# Patient Record
Sex: MALE | Race: WHITE | HISPANIC OR LATINO | Employment: FULL TIME | URBAN - METROPOLITAN AREA
[De-identification: names, ages, dates, MRNs, and addresses within clinical notes are randomized per-mention and may not be internally consistent; named-entity substitution may affect disease eponyms.]

---

## 2020-05-13 ENCOUNTER — APPOINTMENT (OUTPATIENT)
Dept: CT IMAGING | Facility: HOSPITAL | Age: 20
End: 2020-05-13

## 2020-05-13 ENCOUNTER — HOSPITAL ENCOUNTER (EMERGENCY)
Facility: HOSPITAL | Age: 20
Discharge: HOME | End: 2020-05-14
Attending: EMERGENCY MEDICINE | Admitting: EMERGENCY MEDICINE

## 2020-05-13 ENCOUNTER — APPOINTMENT (OUTPATIENT)
Dept: GENERAL RADIOLOGY | Facility: HOSPITAL | Age: 20
End: 2020-05-13

## 2020-05-13 DIAGNOSIS — I26.99 ACUTE PULMONARY EMBOLISM WITHOUT ACUTE COR PULMONALE, UNSPECIFIED PULMONARY EMBOLISM TYPE (HCC): Primary | ICD-10-CM

## 2020-05-13 LAB
ALBUMIN SERPL-MCNC: 4.7 G/DL (ref 3.5–5.2)
ALBUMIN/GLOB SERPL: 1.4 G/DL
ALP SERPL-CCNC: 77 U/L (ref 39–117)
ALT SERPL W P-5'-P-CCNC: 59 U/L (ref 1–41)
ANION GAP SERPL CALCULATED.3IONS-SCNC: 11.6 MMOL/L (ref 5–15)
AST SERPL-CCNC: 26 U/L (ref 1–40)
BASOPHILS # BLD AUTO: 0.01 10*3/MM3 (ref 0–0.2)
BASOPHILS NFR BLD AUTO: 0.1 % (ref 0–1.5)
BILIRUB SERPL-MCNC: 0.8 MG/DL (ref 0.2–1.2)
BUN BLD-MCNC: 6 MG/DL (ref 6–20)
BUN/CREAT SERPL: 6.1 (ref 7–25)
CALCIUM SPEC-SCNC: 10 MG/DL (ref 8.6–10.5)
CHLORIDE SERPL-SCNC: 98 MMOL/L (ref 98–107)
CO2 SERPL-SCNC: 27.4 MMOL/L (ref 22–29)
CREAT BLD-MCNC: 0.99 MG/DL (ref 0.76–1.27)
D DIMER PPP FEU-MCNC: 3.24 MCGFEU/ML (ref 0–0.49)
DEPRECATED RDW RBC AUTO: 39.9 FL (ref 37–54)
EOSINOPHIL # BLD AUTO: 0.02 10*3/MM3 (ref 0–0.4)
EOSINOPHIL NFR BLD AUTO: 0.2 % (ref 0.3–6.2)
ERYTHROCYTE [DISTWIDTH] IN BLOOD BY AUTOMATED COUNT: 12.3 % (ref 12.3–15.4)
GFR SERPL CREATININE-BSD FRML MDRD: 116 ML/MIN/1.73
GFR SERPL CREATININE-BSD FRML MDRD: 95 ML/MIN/1.73
GLOBULIN UR ELPH-MCNC: 3.4 GM/DL
GLUCOSE BLD-MCNC: 104 MG/DL (ref 65–99)
HCT VFR BLD AUTO: 44.5 % (ref 37.5–51)
HGB BLD-MCNC: 15.1 G/DL (ref 13–17.7)
HOLD SPECIMEN: NORMAL
HOLD SPECIMEN: NORMAL
IMM GRANULOCYTES # BLD AUTO: 0.05 10*3/MM3 (ref 0–0.05)
IMM GRANULOCYTES NFR BLD AUTO: 0.4 % (ref 0–0.5)
LIPASE SERPL-CCNC: 12 U/L (ref 13–60)
LYMPHOCYTES # BLD AUTO: 1.27 10*3/MM3 (ref 0.7–3.1)
LYMPHOCYTES NFR BLD AUTO: 10 % (ref 19.6–45.3)
MCH RBC QN AUTO: 30.4 PG (ref 26.6–33)
MCHC RBC AUTO-ENTMCNC: 33.9 G/DL (ref 31.5–35.7)
MCV RBC AUTO: 89.5 FL (ref 79–97)
MONOCYTES # BLD AUTO: 0.8 10*3/MM3 (ref 0.1–0.9)
MONOCYTES NFR BLD AUTO: 6.3 % (ref 5–12)
NEUTROPHILS # BLD AUTO: 10.5 10*3/MM3 (ref 1.7–7)
NEUTROPHILS NFR BLD AUTO: 83 % (ref 42.7–76)
NRBC BLD AUTO-RTO: 0 /100 WBC (ref 0–0.2)
NT-PROBNP SERPL-MCNC: 31 PG/ML (ref 5–450)
PLATELET # BLD AUTO: 217 10*3/MM3 (ref 140–450)
PMV BLD AUTO: 9.1 FL (ref 6–12)
POTASSIUM BLD-SCNC: 3.7 MMOL/L (ref 3.5–5.2)
PROT SERPL-MCNC: 8.1 G/DL (ref 6–8.5)
RBC # BLD AUTO: 4.97 10*6/MM3 (ref 4.14–5.8)
SODIUM BLD-SCNC: 137 MMOL/L (ref 136–145)
TROPONIN T SERPL-MCNC: <0.01 NG/ML (ref 0–0.03)
WBC NRBC COR # BLD: 12.65 10*3/MM3 (ref 3.4–10.8)
WHOLE BLOOD HOLD SPECIMEN: NORMAL
WHOLE BLOOD HOLD SPECIMEN: NORMAL

## 2020-05-13 PROCEDURE — 96374 THER/PROPH/DIAG INJ IV PUSH: CPT

## 2020-05-13 PROCEDURE — 83880 ASSAY OF NATRIURETIC PEPTIDE: CPT | Performed by: NURSE PRACTITIONER

## 2020-05-13 PROCEDURE — 36415 COLL VENOUS BLD VENIPUNCTURE: CPT

## 2020-05-13 PROCEDURE — 25010000002 HYDROMORPHONE 1 MG/ML SOLUTION: Performed by: EMERGENCY MEDICINE

## 2020-05-13 PROCEDURE — 71045 X-RAY EXAM CHEST 1 VIEW: CPT

## 2020-05-13 PROCEDURE — 74177 CT ABD & PELVIS W/CONTRAST: CPT

## 2020-05-13 PROCEDURE — 0 IOPAMIDOL PER 1 ML: Performed by: EMERGENCY MEDICINE

## 2020-05-13 PROCEDURE — 25010000002 ONDANSETRON PER 1 MG: Performed by: EMERGENCY MEDICINE

## 2020-05-13 PROCEDURE — 85025 COMPLETE CBC W/AUTO DIFF WBC: CPT | Performed by: NURSE PRACTITIONER

## 2020-05-13 PROCEDURE — 71275 CT ANGIOGRAPHY CHEST: CPT

## 2020-05-13 PROCEDURE — 80053 COMPREHEN METABOLIC PANEL: CPT | Performed by: NURSE PRACTITIONER

## 2020-05-13 PROCEDURE — 85379 FIBRIN DEGRADATION QUANT: CPT | Performed by: NURSE PRACTITIONER

## 2020-05-13 PROCEDURE — 96375 TX/PRO/DX INJ NEW DRUG ADDON: CPT

## 2020-05-13 PROCEDURE — 93010 ELECTROCARDIOGRAM REPORT: CPT | Performed by: INTERNAL MEDICINE

## 2020-05-13 PROCEDURE — 25010000002 KETOROLAC TROMETHAMINE PER 15 MG: Performed by: NURSE PRACTITIONER

## 2020-05-13 PROCEDURE — 83690 ASSAY OF LIPASE: CPT | Performed by: NURSE PRACTITIONER

## 2020-05-13 PROCEDURE — 84484 ASSAY OF TROPONIN QUANT: CPT | Performed by: NURSE PRACTITIONER

## 2020-05-13 PROCEDURE — 93005 ELECTROCARDIOGRAM TRACING: CPT | Performed by: NURSE PRACTITIONER

## 2020-05-13 PROCEDURE — 99284 EMERGENCY DEPT VISIT MOD MDM: CPT

## 2020-05-13 RX ORDER — SODIUM CHLORIDE 0.9 % (FLUSH) 0.9 %
10 SYRINGE (ML) INJECTION AS NEEDED
Status: DISCONTINUED | OUTPATIENT
Start: 2020-05-13 | End: 2020-05-14 | Stop reason: HOSPADM

## 2020-05-13 RX ORDER — KETOROLAC TROMETHAMINE 30 MG/ML
30 INJECTION, SOLUTION INTRAMUSCULAR; INTRAVENOUS ONCE
Status: COMPLETED | OUTPATIENT
Start: 2020-05-13 | End: 2020-05-13

## 2020-05-13 RX ORDER — ONDANSETRON 2 MG/ML
4 INJECTION INTRAMUSCULAR; INTRAVENOUS ONCE
Status: COMPLETED | OUTPATIENT
Start: 2020-05-13 | End: 2020-05-13

## 2020-05-13 RX ORDER — HYDROCODONE BITARTRATE AND ACETAMINOPHEN 5; 325 MG/1; MG/1
1 TABLET ORAL EVERY 6 HOURS PRN
Qty: 8 TABLET | Refills: 0 | Status: SHIPPED | OUTPATIENT
Start: 2020-05-13 | End: 2020-05-16 | Stop reason: HOSPADM

## 2020-05-13 RX ADMIN — HYDROMORPHONE HYDROCHLORIDE 1 MG: 1 INJECTION, SOLUTION INTRAMUSCULAR; INTRAVENOUS; SUBCUTANEOUS at 20:05

## 2020-05-13 RX ADMIN — ONDANSETRON 4 MG: 2 INJECTION INTRAMUSCULAR; INTRAVENOUS at 20:05

## 2020-05-13 RX ADMIN — KETOROLAC TROMETHAMINE 30 MG: 30 INJECTION, SOLUTION INTRAMUSCULAR at 19:33

## 2020-05-13 RX ADMIN — APIXABAN 10 MG: 5 TABLET, FILM COATED ORAL at 22:58

## 2020-05-13 RX ADMIN — IOPAMIDOL 95 ML: 755 INJECTION, SOLUTION INTRAVENOUS at 21:59

## 2020-05-13 RX ADMIN — SODIUM CHLORIDE, POTASSIUM CHLORIDE, SODIUM LACTATE AND CALCIUM CHLORIDE 1000 ML: 600; 310; 30; 20 INJECTION, SOLUTION INTRAVENOUS at 20:05

## 2020-05-13 NOTE — ED NOTES
Patient to er from urgent care center with c/o right upper ABD/ rib pain that started yesterday and getting worse. Patient reported no nausea at this time. Patient had mask on in triage along with staff.      Efrain Jara, RN  05/13/20 5274

## 2020-05-13 NOTE — ED PROVIDER NOTES
MD ATTESTATION NOTE    The JOYCE and I have discussed this patient's history, physical exam, and treatment plan.  I have reviewed the documentation and personally had a face to face interaction with the patient. I affirm the documentation and agree with the treatment and plan.  The attached note describes my personal findings.      Jourdan Dumont is a 19 y.o. male who presents to the ED c/o right upper quadrant abdominal pain and right lower chest pain.  Pain is constant.  Onset yesterday.  It is sharp.  It worsens whenever he moves or takes a big breath.  Denies having any recent surgeries.  No recent long distance travel.  No history of DVT.      On exam:  No chest wall or abdominal tenderness  Regular rate and rhythm  Clear to auscultation bilaterally  No lower extremity edema or tenderness    Labs  Recent Results (from the past 24 hour(s))   Comprehensive Metabolic Panel    Collection Time: 05/13/20  7:31 PM   Result Value Ref Range    Glucose 104 (H) 65 - 99 mg/dL    BUN 6 6 - 20 mg/dL    Creatinine 0.99 0.76 - 1.27 mg/dL    Sodium 137 136 - 145 mmol/L    Potassium 3.7 3.5 - 5.2 mmol/L    Chloride 98 98 - 107 mmol/L    CO2 27.4 22.0 - 29.0 mmol/L    Calcium 10.0 8.6 - 10.5 mg/dL    Total Protein 8.1 6.0 - 8.5 g/dL    Albumin 4.70 3.50 - 5.20 g/dL    ALT (SGPT) 59 (H) 1 - 41 U/L    AST (SGOT) 26 1 - 40 U/L    Alkaline Phosphatase 77 39 - 117 U/L    Total Bilirubin 0.8 0.2 - 1.2 mg/dL    eGFR Non African Amer 95 >60 mL/min/1.73    eGFR  African Amer 116 >60 mL/min/1.73    Globulin 3.4 gm/dL    A/G Ratio 1.4 g/dL    BUN/Creatinine Ratio 6.1 (L) 7.0 - 25.0    Anion Gap 11.6 5.0 - 15.0 mmol/L   Lipase    Collection Time: 05/13/20  7:31 PM   Result Value Ref Range    Lipase 12 (L) 13 - 60 U/L   CBC Auto Differential    Collection Time: 05/13/20  7:31 PM   Result Value Ref Range    WBC 12.65 (H) 3.40 - 10.80 10*3/mm3    RBC 4.97 4.14 - 5.80 10*6/mm3    Hemoglobin 15.1 13.0 - 17.7 g/dL    Hematocrit 44.5 37.5 - 51.0 %     MCV 89.5 79.0 - 97.0 fL    MCH 30.4 26.6 - 33.0 pg    MCHC 33.9 31.5 - 35.7 g/dL    RDW 12.3 12.3 - 15.4 %    RDW-SD 39.9 37.0 - 54.0 fl    MPV 9.1 6.0 - 12.0 fL    Platelets 217 140 - 450 10*3/mm3    Neutrophil % 83.0 (H) 42.7 - 76.0 %    Lymphocyte % 10.0 (L) 19.6 - 45.3 %    Monocyte % 6.3 5.0 - 12.0 %    Eosinophil % 0.2 (L) 0.3 - 6.2 %    Basophil % 0.1 0.0 - 1.5 %    Immature Grans % 0.4 0.0 - 0.5 %    Neutrophils, Absolute 10.50 (H) 1.70 - 7.00 10*3/mm3    Lymphocytes, Absolute 1.27 0.70 - 3.10 10*3/mm3    Monocytes, Absolute 0.80 0.10 - 0.90 10*3/mm3    Eosinophils, Absolute 0.02 0.00 - 0.40 10*3/mm3    Basophils, Absolute 0.01 0.00 - 0.20 10*3/mm3    Immature Grans, Absolute 0.05 0.00 - 0.05 10*3/mm3    nRBC 0.0 0.0 - 0.2 /100 WBC   Light Blue Top    Collection Time: 05/13/20  7:31 PM   Result Value Ref Range    Extra Tube hold for add-on    Green Top (Gel)    Collection Time: 05/13/20  7:31 PM   Result Value Ref Range    Extra Tube Hold for add-ons.    Lavender Top    Collection Time: 05/13/20  7:31 PM   Result Value Ref Range    Extra Tube hold for add-on    Gold Top - SST    Collection Time: 05/13/20  7:31 PM   Result Value Ref Range    Extra Tube Hold for add-ons.    D-dimer, Quantitative    Collection Time: 05/13/20  7:31 PM   Result Value Ref Range    D-Dimer, Quantitative 3.24 (H) 0.00 - 0.49 MCGFEU/mL   BNP    Collection Time: 05/13/20  7:31 PM   Result Value Ref Range    proBNP 31.0 5.0 - 450.0 pg/mL   Troponin    Collection Time: 05/13/20  7:31 PM   Result Value Ref Range    Troponin T <0.010 0.000 - 0.030 ng/mL       Radiology  Ct Abdomen Pelvis With Contrast    Result Date: 5/13/2020  CT ABDOMEN PELVIS W CONTRAST-  CLINICAL HISTORY: Pleuritic chest pain  TECHNIQUE: Spiral CT images were acquired through the abdomen and pelvis with IV contrast only and were reconstructed in 3 mm thick slices.  Radiation dose reduction techniques were utilized, including automated exposure control and exposure  modulation based on body size.  COMPARISON: None  FINDINGS: The liver, spleen, pancreas, kidneys, and adrenal glands are unremarkable. The stomach and small and large bowel appear within normal limits. There is no bowel distention. There are no hernias. Images through the lung bases demonstrate bilateral lower lobe pulmonary thromboemboli and also an infiltrative the right lung base posteriorly that is quite likely an area of pulmonary infarction.      Lung findings as noted. No acute process is identified within the abdomen and pelvis.  Comment: Note that there are no findings on the CT of the chest would suggest significant right ventricular strain. The RV LV ratio is 1.0  This report was finalized on 5/13/2020 10:44 PM by Dr. Raheel Pedroza M.D.      Xr Chest 1 View    Result Date: 5/13/2020  XR CHEST 1 VW-  5/13/2020  HISTORY: Chest pain.  Heart size is within normal limits. Lungs appear free of acute infiltrates. Bones and soft tissues are unremarkable.      1. No acute process.  This report was finalized on 5/13/2020 7:12 PM by Dr. Ranjan Christiansen M.D.      Ct Angiogram Chest    Result Date: 5/13/2020  CT ANGIOGRAM CHEST-  CLINICAL HISTORY: Pleuritic chest pain. Evaluate for pulmonary embolism.  TECHNIQUE: Spiral CT images were obtained through the chest during rapid IV injection of contrast and were reconstructed in 2 mm thick axial slices. Multiple coronal and sagittal and 3-D reconstructions were produced.  Radiation dose reduction techniques were utilized, including automated exposure control and exposure modulation based on body size.  COMPARISON: None  FINDINGS: The main pulmonary arteries and their lobar and segmental branches are well opacified. There are moderate-sized filling defects within the right lower lobe pulmonary artery extending into multiple segmental and subsegmental branches within the right lower lobe consistent with pulmonary thromboemboli. A few small pulmonary thromboemboli are also  present within segmental and subsegmental branches of the left lower lobe pulmonary artery. No other filling defects are identified. Lung window images demonstrate localized airspace infiltrate in the posterior and inferior aspect of the right lung base that is quite likely a developing area of pulmonary infarction. No other infiltrates are identified. There are no pleural effusions. There is no mediastinal hilar or axillary lymphadenopathy.      Bilateral lower lobe pulmonary thromboemboli, larger and more numerous on the right than the left with a developing area of pulmonary infarction in the posterior aspect of the right lung base.  This report was finalized on 5/13/2020 10:39 PM by Dr. Raheel Pedroza M.D.        Medical Decision Making:  ED Course as of May 14 0005   Wed May 13, 2020   1950 EKG          EKG time: 1925  Rhythm/Rate: NSR 81  P waves and HI: Normal   QRS, axis: Normal, Normal    ST and T waves: No GARCIA     Interpreted Contemporaneously by me, independently viewed  No prior available     [JS]   2033 D-Dimer, Quant(!): 3.24 [TD]      ED Course User Index  [JS] Onelia Recinos APRN  [TD] Ricco Kimble II, MD       Smoking cessation counseling  I counseled the patient face to face > 3 minutes and < 10 minutes to quit the use of tobacco and provided tobacco cessation strategies.    CT interpreted by myself shows PE that is most prominent on the right side. No RV strain noted.     I discussed the case with Dr. Pedroza, radiology.  Patient has a pulmonary embolism.  Patient has been given his first dose in the emergency department.  He is at low risk for any complications given existing criteria.  He is not tachycardic, not hypoxemic and is in no respiratory distress whatsoever.   seen the patient and he is going to be following up with PCP, the plan is to have him see Nallely Saavedra.      I gave him good return precautions.  I impressed on him the extreme importance of taking these  medications.  He seems to be somewhat skeptical about the need and in fact asked me what would happen if he chose not to take these medications.  Let him know that I truly believe that he will need to be on these blood thinners for the rest of his life.  That he should have further testing performed before committing to lifelong therapy.      Diagnosis  Final diagnoses:   Acute pulmonary embolism without acute cor pulmonale, unspecified pulmonary embolism type (CMS/HCC)       DISCHARGE    FOLLOW-UP  [facility] Emergency Department  [address] [facility] [address] [phone]  Go in 1 day  If symptoms worsen (increased chest pain, increased shortness of breath, bleeding)      Call your primary care doctor tomorrow to set up a follow-up appointment within 1 to 2 weeks.  You will need additional testing to figure out why your blood clots so easily.             Medication List      New Prescriptions    Eliquis DVT/PE Starter Pack tablet  Take two 5 mg tablets by mouth every 12 hours for 7 days. Followed by one   5 mg tablet every 12 hours. (Dispense starter pack if available)     HYDROcodone-acetaminophen 5-325 MG per tablet  Commonly known as:  NORCO  Take 1 tablet by mouth Every 6 (Six) Hours As Needed for Moderate Pain    for up to 2 days.             Ricco Kimble II, MD  05/14/20 0008       Ricco Kimble II, MD  05/14/20 0991

## 2020-05-13 NOTE — ED PROVIDER NOTES
EMERGENCY DEPARTMENT ENCOUNTER    Room Number:  39/39  Date of encounter:  5/13/2020  PCP: Provider, No Known  Historian: patient   Full history not obtainable due to: none     HPI:  Chief Complaint: chest pain     Context: Jourdan Dumont is a 19 y.o. 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 Pain onset was sudden, reported as constant and sharp in the right side of the chest and upper abdomen. Breathing worsens the pain. Nothing improves the pain-tried IBU without relief. Associated cough, blood tinged sputum at times. No vomiting or diarrhea. No fever. No additional cold symptoms. Seen at [facility] just PTA who referred him here for further evaluation.      No hx of blood clot. No recent prolonged travel or immobilization. . Smokes marijuana. Denies any additional drug use.       MEDICAL RECORD REVIEW:Reviewed Dr Ohara, [facility] physician, note from OV dated today's date, just prior to pt arrival to ED. He reported similar hx of hemoptysis and RUQ pain.       PAST MEDICAL HISTORY    Active Ambulatory Problems     Diagnosis Date Noted   • No Active Ambulatory Problems     Resolved Ambulatory Problems     Diagnosis Date Noted   • No Resolved Ambulatory Problems     No Additional Past Medical History         PAST SURGICAL HISTORY  No past surgical history on file.      FAMILY HISTORY  No family history on file.      SOCIAL HISTORY  Social History     Socioeconomic History   • Marital status: Single     Spouse name: Not on file   • Number of children: Not on file   • Years of education: Not on file   • Highest education level: Not on file   Tobacco Use   • Smoking status: Current Every Day Smoker   • Smokeless tobacco: Never Used   Substance and Sexual Activity   • Alcohol use: Yes         ALLERGIES  Patient has no known allergies.        REVIEW OF SYSTEMS  Review of Systems   All systems reviewed and marked as negative except as listed in  HPI       PHYSICAL EXAM    I have reviewed the triage vital signs and nursing notes.    ED Triage Vitals [05/13/20 1829]   Temp Heart Rate Resp BP SpO2   98.3 °F (36.8 °C) 95 -- -- 100 %      Temp src Heart Rate Source Patient Position BP Location FiO2 (%)   Tympanic -- -- -- --       GENERAL: alert well developed, well nourished in moderate distress secondary to pain, clutching right upper abdomen and chest   HENT: NCAT, neck supple, trachea midline  EYES: no scleral icterus, PERRL, normal conjunctiva  CV: regular rhythm, regular rate, no murmur  RESPIRATORY: unlabored effort, CTAB, mild right sided chest wall tenderness.   ABDOMEN: soft, RUQ tenderness guarded but without rebound, non-distended, bowel sounds present  MUSCULOSKELETAL: no gross deformity  NEURO: alert,  sensory and motor function of extremities grossly intact, speech clear, mental status normal/baseline  SKIN: warm, dry, no rash  PSYCH:  Appropriate mood and affect    Vital signs and nursing notes reviewed.          LAB RESULTS  Recent Results (from the past 24 hour(s))   CBC Auto Differential    Collection Time: 05/13/20  7:31 PM   Result Value Ref Range    WBC 12.65 (H) 3.40 - 10.80 10*3/mm3    RBC 4.97 4.14 - 5.80 10*6/mm3    Hemoglobin 15.1 13.0 - 17.7 g/dL    Hematocrit 44.5 37.5 - 51.0 %    MCV 89.5 79.0 - 97.0 fL    MCH 30.4 26.6 - 33.0 pg    MCHC 33.9 31.5 - 35.7 g/dL    RDW 12.3 12.3 - 15.4 %    RDW-SD 39.9 37.0 - 54.0 fl    MPV 9.1 6.0 - 12.0 fL    Platelets 217 140 - 450 10*3/mm3    Neutrophil % 83.0 (H) 42.7 - 76.0 %    Lymphocyte % 10.0 (L) 19.6 - 45.3 %    Monocyte % 6.3 5.0 - 12.0 %    Eosinophil % 0.2 (L) 0.3 - 6.2 %    Basophil % 0.1 0.0 - 1.5 %    Immature Grans % 0.4 0.0 - 0.5 %    Neutrophils, Absolute 10.50 (H) 1.70 - 7.00 10*3/mm3    Lymphocytes, Absolute 1.27 0.70 - 3.10 10*3/mm3    Monocytes, Absolute 0.80 0.10 - 0.90 10*3/mm3    Eosinophils, Absolute 0.02 0.00 - 0.40 10*3/mm3    Basophils, Absolute 0.01 0.00 - 0.20 10*3/mm3     Immature Grans, Absolute 0.05 0.00 - 0.05 10*3/mm3    nRBC 0.0 0.0 - 0.2 /100 WBC       Ordered the above labs and independently reviewed the results.        RADIOLOGY  Xr Chest 1 View    Result Date: 5/13/2020  XR CHEST 1 VW-  5/13/2020  HISTORY: Chest pain.  Heart size is within normal limits. Lungs appear free of acute infiltrates. Bones and soft tissues are unremarkable.      1. No acute process.  This report was finalized on 5/13/2020 7:12 PM by Dr. Ranjan Christiansen M.D.        I ordered the above noted radiological studies. Independently reviewed by me and discussed with radiologist.  See dictation above for official radiology interpretation.      PROCEDURES    Procedures        MEDICATIONS GIVEN IN ER    Medications   sodium chloride 0.9 % flush 10 mL (has no administration in time range)   HYDROmorphone (DILAUDID) injection 1 mg (has no administration in time range)   ondansetron (ZOFRAN) injection 4 mg (has no administration in time range)   lactated ringers bolus 1,000 mL (has no administration in time range)   ketorolac (TORADOL) injection 30 mg (30 mg Intravenous Given 5/13/20 1933)         PROGRESS, DATA ANALYSIS, CONSULTS, AND MEDICAL DECISION MAKING    All labs have been independently reviewed by me.  All radiology studies have been reviewed by me.   EKG's independently reviewed by me.  Discussion below represents my analysis of pertinent findings related to patient's condition, differential diagnosis, treatment plan and final disposition.    DIFFERENTIAL DIAGNOSIS INCLUDE BUT NOT LIMITED TO: Cholecystitis, appendicitis, acute abdomen, acute pancreatitis, pulmonary embolism, costochondritis, pleurisy, pneumothorax, URI, aortic dissection, endocarditis, myocarditis, AMI, polysubstance abuse, cyclic abdominal pain syndrome, nephrolithiasis, renal infarct    ED Course as of May 13 1950   Wed May 13, 2020   1950 EKG          EKG time: 1925  Rhythm/Rate: NSR 81  P waves and OR: Normal   QRS, axis:  Normal, Normal    ST and T waves: No GARCIA     Interpreted Contemporaneously by me, independently viewed  No prior available     [JS]      ED Course User Index  [JS] Onelia Recinos APRN       AS OF 19:50 VITALS:    BP - 122/68  HR - 81  TEMP - 98.3 °F (36.8 °C) (Tympanic)  02 SATS - 99%         Onelia Recinos APRN  05/13/20 1949       Onelia Recinos APRN  05/13/20 1950

## 2020-05-14 ENCOUNTER — APPOINTMENT (OUTPATIENT)
Dept: CARDIOLOGY | Facility: HOSPITAL | Age: 20
End: 2020-05-14

## 2020-05-14 ENCOUNTER — HOSPITAL ENCOUNTER (INPATIENT)
Facility: HOSPITAL | Age: 20
LOS: 2 days | Discharge: HOME | End: 2020-05-16
Attending: EMERGENCY MEDICINE | Admitting: INTERNAL MEDICINE

## 2020-05-14 ENCOUNTER — NURSE TRIAGE (OUTPATIENT)
Dept: CALL CENTER | Facility: HOSPITAL | Age: 20
End: 2020-05-14

## 2020-05-14 VITALS
RESPIRATION RATE: 16 BRPM | OXYGEN SATURATION: 98 % | WEIGHT: 160 LBS | BODY MASS INDEX: 23.7 KG/M2 | SYSTOLIC BLOOD PRESSURE: 124 MMHG | HEART RATE: 88 BPM | TEMPERATURE: 97.8 F | HEIGHT: 69 IN | DIASTOLIC BLOOD PRESSURE: 74 MMHG

## 2020-05-14 DIAGNOSIS — I26.99 BILATERAL PULMONARY EMBOLISM (HCC): Primary | ICD-10-CM

## 2020-05-14 DIAGNOSIS — I26.99 ACUTE PULMONARY EMBOLISM WITHOUT ACUTE COR PULMONALE, UNSPECIFIED PULMONARY EMBOLISM TYPE (HCC): ICD-10-CM

## 2020-05-14 DIAGNOSIS — I26.99 PULMONARY INFARCT (HCC): ICD-10-CM

## 2020-05-14 LAB
ALBUMIN SERPL-MCNC: 4.6 G/DL (ref 3.5–5.2)
ALBUMIN/GLOB SERPL: 1.4 G/DL
ALP SERPL-CCNC: 74 U/L (ref 39–117)
ALT SERPL W P-5'-P-CCNC: 48 U/L (ref 1–41)
ANION GAP SERPL CALCULATED.3IONS-SCNC: 12.1 MMOL/L (ref 5–15)
APTT PPP: 38.3 SECONDS (ref 22.7–35.4)
APTT PPP: 49.1 SECONDS (ref 22.7–35.4)
AST SERPL-CCNC: 20 U/L (ref 1–40)
BASOPHILS # BLD AUTO: 0.01 10*3/MM3 (ref 0–0.2)
BASOPHILS NFR BLD AUTO: 0.1 % (ref 0–1.5)
BH CV LOW VAS LEFT POSTERIOR TIBIAL VESSEL: 1
BH CV LOWER VASCULAR LEFT COMMON FEMORAL AUGMENT: NORMAL
BH CV LOWER VASCULAR LEFT COMMON FEMORAL COMPETENT: NORMAL
BH CV LOWER VASCULAR LEFT COMMON FEMORAL COMPRESS: NORMAL
BH CV LOWER VASCULAR LEFT COMMON FEMORAL PHASIC: NORMAL
BH CV LOWER VASCULAR LEFT COMMON FEMORAL SPONT: NORMAL
BH CV LOWER VASCULAR LEFT DISTAL FEMORAL COMPRESS: NORMAL
BH CV LOWER VASCULAR LEFT GASTRONEMIUS COMPRESS: NORMAL
BH CV LOWER VASCULAR LEFT GREATER SAPH AK COMPRESS: NORMAL
BH CV LOWER VASCULAR LEFT GREATER SAPH BK COMPRESS: NORMAL
BH CV LOWER VASCULAR LEFT MID FEMORAL AUGMENT: NORMAL
BH CV LOWER VASCULAR LEFT MID FEMORAL COMPETENT: NORMAL
BH CV LOWER VASCULAR LEFT MID FEMORAL COMPRESS: NORMAL
BH CV LOWER VASCULAR LEFT MID FEMORAL PHASIC: NORMAL
BH CV LOWER VASCULAR LEFT MID FEMORAL SPONT: NORMAL
BH CV LOWER VASCULAR LEFT PERONEAL COMPRESS: NORMAL
BH CV LOWER VASCULAR LEFT POPLITEAL AUGMENT: NORMAL
BH CV LOWER VASCULAR LEFT POPLITEAL COMPETENT: NORMAL
BH CV LOWER VASCULAR LEFT POPLITEAL COMPRESS: NORMAL
BH CV LOWER VASCULAR LEFT POPLITEAL PHASIC: NORMAL
BH CV LOWER VASCULAR LEFT POPLITEAL SPONT: NORMAL
BH CV LOWER VASCULAR LEFT POSTERIOR TIBIAL COMPRESS: NORMAL
BH CV LOWER VASCULAR LEFT POSTERIOR TIBIAL THROMBUS: NORMAL
BH CV LOWER VASCULAR LEFT PROFUNDA FEMORAL COMPRESS: NORMAL
BH CV LOWER VASCULAR LEFT PROXIMAL FEMORAL COMPRESS: NORMAL
BH CV LOWER VASCULAR LEFT SAPHENOFEMORAL JUNCTION COMPRESS: NORMAL
BH CV LOWER VASCULAR RIGHT COMMON FEMORAL AUGMENT: NORMAL
BH CV LOWER VASCULAR RIGHT COMMON FEMORAL COMPETENT: NORMAL
BH CV LOWER VASCULAR RIGHT COMMON FEMORAL COMPRESS: NORMAL
BH CV LOWER VASCULAR RIGHT COMMON FEMORAL PHASIC: NORMAL
BH CV LOWER VASCULAR RIGHT COMMON FEMORAL SPONT: NORMAL
BH CV LOWER VASCULAR RIGHT DISTAL FEMORAL COMPRESS: NORMAL
BH CV LOWER VASCULAR RIGHT GASTRONEMIUS COMPRESS: NORMAL
BH CV LOWER VASCULAR RIGHT GREATER SAPH AK COMPRESS: NORMAL
BH CV LOWER VASCULAR RIGHT GREATER SAPH BK COMPRESS: NORMAL
BH CV LOWER VASCULAR RIGHT MID FEMORAL AUGMENT: NORMAL
BH CV LOWER VASCULAR RIGHT MID FEMORAL COMPETENT: NORMAL
BH CV LOWER VASCULAR RIGHT MID FEMORAL COMPRESS: NORMAL
BH CV LOWER VASCULAR RIGHT MID FEMORAL PHASIC: NORMAL
BH CV LOWER VASCULAR RIGHT MID FEMORAL SPONT: NORMAL
BH CV LOWER VASCULAR RIGHT PERONEAL COMPRESS: NORMAL
BH CV LOWER VASCULAR RIGHT POPLITEAL AUGMENT: NORMAL
BH CV LOWER VASCULAR RIGHT POPLITEAL COMPETENT: NORMAL
BH CV LOWER VASCULAR RIGHT POPLITEAL COMPRESS: NORMAL
BH CV LOWER VASCULAR RIGHT POPLITEAL PHASIC: NORMAL
BH CV LOWER VASCULAR RIGHT POPLITEAL SPONT: NORMAL
BH CV LOWER VASCULAR RIGHT POSTERIOR TIBIAL COMPRESS: NORMAL
BH CV LOWER VASCULAR RIGHT PROFUNDA FEMORAL COMPRESS: NORMAL
BH CV LOWER VASCULAR RIGHT PROXIMAL FEMORAL COMPRESS: NORMAL
BH CV LOWER VASCULAR RIGHT SAPHENOFEMORAL JUNCTION COMPRESS: NORMAL
BILIRUB SERPL-MCNC: 1.1 MG/DL (ref 0.2–1.2)
BUN BLD-MCNC: 6 MG/DL (ref 6–20)
BUN/CREAT SERPL: 6 (ref 7–25)
CALCIUM SPEC-SCNC: 9.6 MG/DL (ref 8.6–10.5)
CHLORIDE SERPL-SCNC: 98 MMOL/L (ref 98–107)
CO2 SERPL-SCNC: 26.9 MMOL/L (ref 22–29)
CREAT BLD-MCNC: 1 MG/DL (ref 0.76–1.27)
DEPRECATED RDW RBC AUTO: 41.6 FL (ref 37–54)
EOSINOPHIL # BLD AUTO: 0.01 10*3/MM3 (ref 0–0.4)
EOSINOPHIL NFR BLD AUTO: 0.1 % (ref 0.3–6.2)
ERYTHROCYTE [DISTWIDTH] IN BLOOD BY AUTOMATED COUNT: 12.7 % (ref 12.3–15.4)
GFR SERPL CREATININE-BSD FRML MDRD: 114 ML/MIN/1.73
GFR SERPL CREATININE-BSD FRML MDRD: 94 ML/MIN/1.73
GLOBULIN UR ELPH-MCNC: 3.2 GM/DL
GLUCOSE BLD-MCNC: 105 MG/DL (ref 65–99)
HCT VFR BLD AUTO: 44.2 % (ref 37.5–51)
HGB BLD-MCNC: 15.3 G/DL (ref 13–17.7)
IMM GRANULOCYTES # BLD AUTO: 0.04 10*3/MM3 (ref 0–0.05)
IMM GRANULOCYTES NFR BLD AUTO: 0.3 % (ref 0–0.5)
INR PPP: 1.5 (ref 0.9–1.1)
LYMPHOCYTES # BLD AUTO: 1.21 10*3/MM3 (ref 0.7–3.1)
LYMPHOCYTES NFR BLD AUTO: 8.7 % (ref 19.6–45.3)
MCH RBC QN AUTO: 31 PG (ref 26.6–33)
MCHC RBC AUTO-ENTMCNC: 34.6 G/DL (ref 31.5–35.7)
MCV RBC AUTO: 89.7 FL (ref 79–97)
MONOCYTES # BLD AUTO: 0.85 10*3/MM3 (ref 0.1–0.9)
MONOCYTES NFR BLD AUTO: 6.1 % (ref 5–12)
NEUTROPHILS # BLD AUTO: 11.75 10*3/MM3 (ref 1.7–7)
NEUTROPHILS NFR BLD AUTO: 84.7 % (ref 42.7–76)
NRBC BLD AUTO-RTO: 0 /100 WBC (ref 0–0.2)
NT-PROBNP SERPL-MCNC: 76 PG/ML (ref 5–450)
PLATELET # BLD AUTO: 212 10*3/MM3 (ref 140–450)
PMV BLD AUTO: 9.1 FL (ref 6–12)
POTASSIUM BLD-SCNC: 3.8 MMOL/L (ref 3.5–5.2)
PROT SERPL-MCNC: 7.8 G/DL (ref 6–8.5)
PROTHROMBIN TIME: 17.8 SECONDS (ref 11.7–14.2)
RBC # BLD AUTO: 4.93 10*6/MM3 (ref 4.14–5.8)
SODIUM BLD-SCNC: 137 MMOL/L (ref 136–145)
TROPONIN T SERPL-MCNC: <0.01 NG/ML (ref 0–0.03)
WBC NRBC COR # BLD: 13.87 10*3/MM3 (ref 3.4–10.8)

## 2020-05-14 PROCEDURE — 25010000002 METHYLPREDNISOLONE PER 125 MG: Performed by: INTERNAL MEDICINE

## 2020-05-14 PROCEDURE — 85025 COMPLETE CBC W/AUTO DIFF WBC: CPT | Performed by: EMERGENCY MEDICINE

## 2020-05-14 PROCEDURE — 25010000002 MORPHINE PER 10 MG: Performed by: INTERNAL MEDICINE

## 2020-05-14 PROCEDURE — 25010000002 MORPHINE PER 10 MG: Performed by: EMERGENCY MEDICINE

## 2020-05-14 PROCEDURE — 85613 RUSSELL VIPER VENOM DILUTED: CPT | Performed by: INTERNAL MEDICINE

## 2020-05-14 PROCEDURE — 85610 PROTHROMBIN TIME: CPT | Performed by: PHYSICIAN ASSISTANT

## 2020-05-14 PROCEDURE — 81241 F5 GENE: CPT | Performed by: INTERNAL MEDICINE

## 2020-05-14 PROCEDURE — 87635 SARS-COV-2 COVID-19 AMP PRB: CPT | Performed by: INTERNAL MEDICINE

## 2020-05-14 PROCEDURE — 99223 1ST HOSP IP/OBS HIGH 75: CPT | Performed by: INTERNAL MEDICINE

## 2020-05-14 PROCEDURE — 81240 F2 GENE: CPT | Performed by: INTERNAL MEDICINE

## 2020-05-14 PROCEDURE — 99284 EMERGENCY DEPT VISIT MOD MDM: CPT

## 2020-05-14 PROCEDURE — 93010 ELECTROCARDIOGRAM REPORT: CPT | Performed by: INTERNAL MEDICINE

## 2020-05-14 PROCEDURE — 93005 ELECTROCARDIOGRAM TRACING: CPT | Performed by: EMERGENCY MEDICINE

## 2020-05-14 PROCEDURE — 25010000002 ONDANSETRON PER 1 MG: Performed by: EMERGENCY MEDICINE

## 2020-05-14 PROCEDURE — 85730 THROMBOPLASTIN TIME PARTIAL: CPT | Performed by: PHYSICIAN ASSISTANT

## 2020-05-14 PROCEDURE — 85730 THROMBOPLASTIN TIME PARTIAL: CPT | Performed by: INTERNAL MEDICINE

## 2020-05-14 PROCEDURE — 80053 COMPREHEN METABOLIC PANEL: CPT | Performed by: EMERGENCY MEDICINE

## 2020-05-14 PROCEDURE — 85300 ANTITHROMBIN III ACTIVITY: CPT | Performed by: INTERNAL MEDICINE

## 2020-05-14 PROCEDURE — 85306 CLOT INHIBIT PROT S FREE: CPT | Performed by: INTERNAL MEDICINE

## 2020-05-14 PROCEDURE — 25010000002 HEPARIN (PORCINE) PER 1000 UNITS: Performed by: PHYSICIAN ASSISTANT

## 2020-05-14 PROCEDURE — 86146 BETA-2 GLYCOPROTEIN ANTIBODY: CPT | Performed by: INTERNAL MEDICINE

## 2020-05-14 PROCEDURE — 85705 THROMBOPLASTIN INHIBITION: CPT | Performed by: INTERNAL MEDICINE

## 2020-05-14 PROCEDURE — 85302 CLOT INHIBIT PROT C ANTIGEN: CPT | Performed by: INTERNAL MEDICINE

## 2020-05-14 PROCEDURE — 93970 EXTREMITY STUDY: CPT

## 2020-05-14 PROCEDURE — 85240 CLOT FACTOR VIII AHG 1 STAGE: CPT | Performed by: INTERNAL MEDICINE

## 2020-05-14 PROCEDURE — 85732 THROMBOPLASTIN TIME PARTIAL: CPT | Performed by: INTERNAL MEDICINE

## 2020-05-14 PROCEDURE — 85670 THROMBIN TIME PLASMA: CPT | Performed by: INTERNAL MEDICINE

## 2020-05-14 PROCEDURE — 84484 ASSAY OF TROPONIN QUANT: CPT | Performed by: EMERGENCY MEDICINE

## 2020-05-14 PROCEDURE — 83880 ASSAY OF NATRIURETIC PEPTIDE: CPT | Performed by: EMERGENCY MEDICINE

## 2020-05-14 PROCEDURE — 85305 CLOT INHIBIT PROT S TOTAL: CPT | Performed by: INTERNAL MEDICINE

## 2020-05-14 PROCEDURE — 85303 CLOT INHIBIT PROT C ACTIVITY: CPT | Performed by: INTERNAL MEDICINE

## 2020-05-14 PROCEDURE — 86147 CARDIOLIPIN ANTIBODY EA IG: CPT | Performed by: INTERNAL MEDICINE

## 2020-05-14 RX ORDER — ONDANSETRON 2 MG/ML
4 INJECTION INTRAMUSCULAR; INTRAVENOUS ONCE
Status: COMPLETED | OUTPATIENT
Start: 2020-05-14 | End: 2020-05-14

## 2020-05-14 RX ORDER — MORPHINE SULFATE 2 MG/ML
4 INJECTION, SOLUTION INTRAMUSCULAR; INTRAVENOUS EVERY 4 HOURS PRN
Status: DISCONTINUED | OUTPATIENT
Start: 2020-05-14 | End: 2020-05-14

## 2020-05-14 RX ORDER — MORPHINE SULFATE 2 MG/ML
4 INJECTION, SOLUTION INTRAMUSCULAR; INTRAVENOUS ONCE
Status: COMPLETED | OUTPATIENT
Start: 2020-05-14 | End: 2020-05-14

## 2020-05-14 RX ORDER — METHYLPREDNISOLONE SODIUM SUCCINATE 125 MG/2ML
60 INJECTION, POWDER, LYOPHILIZED, FOR SOLUTION INTRAMUSCULAR; INTRAVENOUS 2 TIMES DAILY
Status: DISCONTINUED | OUTPATIENT
Start: 2020-05-14 | End: 2020-05-15

## 2020-05-14 RX ORDER — OXYCODONE HYDROCHLORIDE 5 MG/1
10 TABLET ORAL EVERY 4 HOURS PRN
Status: DISCONTINUED | OUTPATIENT
Start: 2020-05-14 | End: 2020-05-16 | Stop reason: HOSPADM

## 2020-05-14 RX ORDER — SODIUM CHLORIDE 0.9 % (FLUSH) 0.9 %
10 SYRINGE (ML) INJECTION AS NEEDED
Status: DISCONTINUED | OUTPATIENT
Start: 2020-05-14 | End: 2020-05-16 | Stop reason: HOSPADM

## 2020-05-14 RX ORDER — HYDROCODONE BITARTRATE AND ACETAMINOPHEN 7.5; 325 MG/1; MG/1
1 TABLET ORAL EVERY 6 HOURS PRN
Status: DISCONTINUED | OUTPATIENT
Start: 2020-05-14 | End: 2020-05-14

## 2020-05-14 RX ORDER — HEPARIN SODIUM 10000 [USP'U]/100ML
18 INJECTION, SOLUTION INTRAVENOUS
Status: DISCONTINUED | OUTPATIENT
Start: 2020-05-14 | End: 2020-05-16 | Stop reason: HOSPADM

## 2020-05-14 RX ORDER — HYDROCODONE BITARTRATE AND ACETAMINOPHEN 5; 325 MG/1; MG/1
1 TABLET ORAL ONCE
Status: COMPLETED | OUTPATIENT
Start: 2020-05-14 | End: 2020-05-14

## 2020-05-14 RX ORDER — MORPHINE SULFATE 2 MG/ML
4 INJECTION, SOLUTION INTRAMUSCULAR; INTRAVENOUS
Status: DISCONTINUED | OUTPATIENT
Start: 2020-05-14 | End: 2020-05-16 | Stop reason: HOSPADM

## 2020-05-14 RX ADMIN — MORPHINE SULFATE 4 MG: 2 INJECTION, SOLUTION INTRAMUSCULAR; INTRAVENOUS at 13:55

## 2020-05-14 RX ADMIN — MORPHINE SULFATE 4 MG: 2 INJECTION, SOLUTION INTRAMUSCULAR; INTRAVENOUS at 22:16

## 2020-05-14 RX ADMIN — METHYLPREDNISOLONE SODIUM SUCCINATE 60 MG: 125 INJECTION, POWDER, FOR SOLUTION INTRAMUSCULAR; INTRAVENOUS at 16:04

## 2020-05-14 RX ADMIN — HYDROCODONE BITARTRATE AND ACETAMINOPHEN 1 TABLET: 5; 325 TABLET ORAL at 00:10

## 2020-05-14 RX ADMIN — OXYCODONE HYDROCHLORIDE 10 MG: 5 TABLET ORAL at 20:26

## 2020-05-14 RX ADMIN — MORPHINE SULFATE 4 MG: 2 INJECTION, SOLUTION INTRAMUSCULAR; INTRAVENOUS at 06:34

## 2020-05-14 RX ADMIN — HYDROCODONE BITARTRATE AND ACETAMINOPHEN 1 TABLET: 7.5; 325 TABLET ORAL at 09:28

## 2020-05-14 RX ADMIN — ONDANSETRON 4 MG: 2 INJECTION INTRAMUSCULAR; INTRAVENOUS at 06:34

## 2020-05-14 RX ADMIN — OXYCODONE HYDROCHLORIDE 10 MG: 5 TABLET ORAL at 16:04

## 2020-05-14 RX ADMIN — HEPARIN SODIUM 18 UNITS/KG/HR: 10000 INJECTION, SOLUTION INTRAVENOUS at 16:04

## 2020-05-14 RX ADMIN — MORPHINE SULFATE 4 MG: 2 INJECTION, SOLUTION INTRAMUSCULAR; INTRAVENOUS at 18:33

## 2020-05-14 NOTE — TELEPHONE ENCOUNTER
"Reviewed guideline and AVS with caller, AVS states to return to ED if he experiences severe chest pain. Caller advised he should go back to ED for pain control. Caller agrees to follow care advice.     Reason for Disposition  • SEVERE chest pain    Additional Information  • Negative: Severe difficulty breathing (e.g., struggling for each breath, speaks in single words)  • Negative: Difficult to awaken or acting confused (e.g., disoriented, slurred speech)  • Negative: Shock suspected (e.g., cold/pale/clammy skin, too weak to stand, low BP, rapid pulse)  • Negative: [1] Chest pain lasts > 5 minutes AND [2] history of heart disease  (i.e., heart attack, bypass surgery, angina, angioplasty, CHF; not just a heart murmur)  • Negative: [1] Chest pain lasts > 5 minutes AND [2] described as crushing, pressure-like, or heavy  • Negative: [1] Chest pain lasts > 5 minutes AND [2] age > 50  • Negative: [1] Chest pain lasts > 5 minutes AND [2] age > 30 AND [3] at least one cardiac risk factor (i.e., hypertension, diabetes, obesity, smoker or strong family history of heart disease)  • Negative: [1] Chest pain lasts > 5 minutes AND [2] not relieved with nitroglycerin  • Negative: Passed out (i.e., lost consciousness, collapsed and was not responding)  • Negative: Heart beating < 50 beats per minute OR > 140 beats per minute  • Negative: Visible sweat on face or sweat dripping down face  • Negative: Sounds like a life-threatening emergency to the triager  • Negative: Followed a chest injury    Answer Assessment - Initial Assessment Questions  1. LOCATION: \"Where does it hurt?\"        Right chestg and ribs  2. RADIATION: \"Does the pain go anywhere else?\" (e.g., into neck, jaw, arms, back)      no  3. ONSET: \"When did the chest pain begin?\" (Minutes, hours or days)       yesterday  4. PATTERN \"Does the pain come and go, or has it been constant since it started?\"  \"Does it get worse with exertion?\"       Constant   5. DURATION: \"How " "long does it last\" (e.g., seconds, minutes, hours)      Constant   6. SEVERITY: \"How bad is the pain?\"  (e.g., Scale 1-10; mild, moderate, or severe)     - MILD (1-3): doesn't interfere with normal activities      - MODERATE (4-7): interferes with normal activities or awakens from sleep     - SEVERE (8-10): excruciating pain, unable to do any normal activities        10/10  7. CARDIAC RISK FACTORS: \"Do you have any history of heart problems or risk factors for heart disease?\" (e.g., prior heart attack, angina; high blood pressure, diabetes, being overweight, high cholesterol, smoking, or strong family history of heart disease)      Diagnosed with PE earlier tonight in ED  8. PULMONARY RISK FACTORS: \"Do you have any history of lung disease?\"  (e.g., blood clots in lung, asthma, emphysema, birth control pills)      Blood clot in lung  9. CAUSE: \"What do you think is causing the chest pain?\"      PE  10. OTHER SYMPTOMS: \"Do you have any other symptoms?\" (e.g., dizziness, nausea, vomiting, sweating, fever, difficulty breathing, cough)        no  11. PREGNANCY: \"Is there any chance you are pregnant?\" \"When was your last menstrual period?\"        no    Protocols used: CHEST PAIN-ADULT-AH      "

## 2020-05-14 NOTE — ED NOTES
Pt making repeated requests for pain medication. This RN advised medication cannot be administered unless ordered by provider. Provider has not yet seen pt.      Onelia Lee RN  05/14/20 0602

## 2020-05-14 NOTE — PROGRESS NOTES
Discharge Planning Assessment  [facility]     Patient Name: Jourdan Dumont  MRN: [id number]  Today's Date: 5/14/2020    Admit Date: 5/14/2020    Discharge Needs Assessment     Row Name 05/14/20 1646       Living Environment    Lives With  sibling(s);parent(s)    Name(s) of Who Lives With Patient  mother (Domi Mccormick, [phone]), father, and sister    Current Living Arrangements  home/apartment/condo    Primary Care Provided by  self    Provides Primary Care For  no one    Family Caregiver if Needed  parent(s)    Quality of Family Relationships  helpful;involved;supportive    Able to Return to Prior Arrangements  yes       Resource/Environmental Concerns    Resource/Environmental Concerns  none       Transition Planning    Patient/Family Anticipates Transition to  home with family    Patient/Family Anticipated Services at Transition  none    Transportation Anticipated  family or friend will provide       Discharge Needs Assessment    Concerns to be Addressed  no discharge needs identified;denies needs/concerns at this time    Equipment Currently Used at Home  none    Discharge Coordination/Progress  Home        Discharge Plan     Row Name 05/14/20 5281       Plan    Plan  Home with family    Patient/Family in Agreement with Plan  yes    Plan Comments  CCP met with pt to verify information and discuss d/c planning. Pt resides with his parents and sister in a home with no accessibility concerns, and denies h/o DME use, home health, PT. Pt uses [facility] but agrees to Meds to Beds enrollment (updated in EPIC). Pt provided two weeks of Eliquis via [facility] ED visit yesterday. CCP verified via pt's [facility] [address] pharmacy ([phone]) and [facility] pharmacy ([place]) that e-scribed month supply of Eliquis was not received. Will follow up for new precscription and verify via [facility] pharmacy prior to d/c. Pt denies additional known needs at this time. Mary Pathak, ROMEO        Destination       Coordination has not been started for this encounter.      Durable Medical Equipment      Coordination has not been started for this encounter.      Dialysis/Infusion      Coordination has not been started for this encounter.      Home Medical Care      Coordination has not been started for this encounter.      Therapy      Coordination has not been started for this encounter.      Community Resources      Coordination has not been started for this encounter.          Demographic Summary     Row Name 05/14/20 1648       General Information    Admission Type  inpatient    Arrived From  home    Referral Source  admission list    Reason for Consult  discharge planning    Preferred Language  English        Functional Status     Row Name 05/14/20 7227       Functional Status    Usual Activity Tolerance  good    Current Activity Tolerance  good       Functional Status, IADL    Medications  independent    Meal Preparation  independent    Housekeeping  independent    Laundry  independent    Shopping  independent       Mental Status Summary    Recent Changes in Mental Status/Cognitive Functioning  no changes        Psychosocial    No documentation.       Abuse/Neglect    No documentation.       Legal    No documentation.       Substance Abuse    No documentation.       Patient Forms    No documentation.           Mariola Pathak LCSW

## 2020-05-14 NOTE — PROGRESS NOTES
Eliquis starter pack given to pt with instruction pamphlet. Pt verbalized understanding of instructions. Darlene Esparza RN

## 2020-05-14 NOTE — ED TRIAGE NOTES
Pt walked into ED and approached triage desk with c/o chest pain like he had when he left here several hours ago.  Pt states that he has blood clots on his lungs and it hasn't gotten better.  Pt wearing mask.

## 2020-05-14 NOTE — ED PROVIDER NOTES
MD ATTESTATION NOTE    The JOYCE and I have discussed this patient's history, physical exam, and treatment plan. I have reviewed the documentation and personally had a face to face interaction with the patient. I affirm the JOYCE documentation and agree with their diagnostics, findings, treatment, plan, and disposition.  The attached note describes my personal findings.    Jourdan Dumont is a 19 y.o. male who presents to the ED c/o constant right-sided chest pain since Wednesday.  Patient was seen in the emergency department last night, diagnosed with pulmonary embolisms bilaterally as well as right pulmonary infarct.  Patient was discharged.  Patient presented with worsening chest pain, worsening shortness of breath.  Patient does endorse hemoptysis, denies any recent trauma, unilateral leg swelling, major surgery or period of immobility.  Patient is not on hormone therapy, not being treated for cancer.  No history of DVT or PE in the past.  No family history of clotting disorders.    On exam:  General: NAD  Head: NCAT  ENT: Extraocular motion intact, pupils equal and round reactive to light, moist mucous membranes  Neck: Supple, trachea midline  Cardiac, regular rate and rhythm  Lungs: Clear to auscultation bilaterally  Abdomen: Soft, nontender, no rebound tenderness/guarding/rigidity  Extremities: Moves all extremities well, no peripheral edema.Bilateral lower extremities: No edema, no redness warmth or skin changes, no palpable cords, negative Homans  Skin: Warm, dry    Medical Decision Making:  Face mask and gloves were worn throughout the patient encounter, unless additional PPE was worn and specified below. Hand hygiene was performed before entering and after leaving the patient room.      ED Course as of May 14 0744   Thu May 14, 2020   0612 Patient presents to ER with right-sided chest pain since yesterday.  Patient was seen in ER and diagnosed with bilateral PEs.  He says the pain is worse and brought him to the  ER.  Patient slightly tachycardic, sats 99% on room air.  Plan for pain control and reevaluation.    [EE]   0644 EKG interpreted by myself.  Time 0622.  Sinus rhythm, 89 bpm.  Normal P/MARYANA.  QRS shows normal axis.  No significant ST abnormalities.  No significant changes since 5/13/2020.    [EE]   0701 proBNP: 76.0 [EE]   0701 Troponin T: <0.010 [EE]   0701 WBC(!): 13.87 [EE]   0715 Plan to admit patient for further evaluation.    [EE]   0731 I discussed pt with Dr. Jones.  She will admit for further care.  She would like me to start heparin.     [EE]   0737 I discussed heparin treatment with pharmacist.  Ideally patient should wait until his next scheduled dose of Eliquis prior to starting heparin.  This will be at approximately 4 PM.  Patient has stable vitals, I believe this is reasonable.  I will put orders in accordingly.    [EE]      ED Course User Index  [EE] Philipp Cheatham PA       Diagnosis  Final diagnoses:   Bilateral pulmonary embolism (CMS/HCC)   Pulmonary infarct (CMS/HCC)        Sav Albrecht MD  05/14/20 3206

## 2020-05-14 NOTE — CONSULTS
[facility] INITIAL INPATIENT CONSULTATION NOTE    REASON FOR CONSULTATION: Bilateral pulmonary embolism    HISTORY OF PRESENT ILLNESS:  Jourdan Dumont is a 19 y.o. male who we are asked to see today in consultation for bilateral pulmonary embolism.        Presented to urgent care 5/13/2020 with sharp right upper quadrant and right lower chest pain for about 1 day.  Some hemoptysis.  He was sent to the emergency department.    Chest x-ray showed no acute process.  A CT angiogram of the chest 5/13/2020 showed bilateral lower lobe emboli larger and more numerous on the right than the left with developing infarction at the right lung base.    No acute process in the abdomen or pelvis.    Baseline PTT 38.3 seconds.  Baseline INR 1.50.  D-dimer elevated at 3.24.  Complete metabolic panel normal.  Troponin and BNP normal.  Mild leukocytosis with a white blood cell count of 12-13,000 with neutrophilia.  Hemoglobin and platelets normal.  EKG with sinus rhythm.    Denies history of venous thromboembolism.  No known family history.  No leg pain or edema.  No immobilization.  No lower extremity injuries.  No prolonged travel.      Currently experiencing severe sharp right sided chest pain with talking or taking deep breaths.      PMHx:  History reviewed. No pertinent past medical history.    History reviewed. No pertinent surgical history.    SOCIAL HISTORY:   reports that he has been smoking. He has never used smokeless tobacco. He reports that he drinks alcohol. His drug history is not on file.   Some marijuana use    FAMILY HISTORY:  family history is not on file.  No h/o VTE    ALLERGIES:  No Known Allergies    MEDICATIONS:  As listed in the electronic medical record.    Review of Systems   Respiratory: Positive for shortness of breath.    Cardiovascular: Positive for chest pain. Negative for leg swelling.   All other systems reviewed and are negative.      Vitals:    05/14/20 0645 05/14/20 0705 05/14/20 0757  "05/14/20 0848   BP:  109/67  123/88   BP Location:    Left arm   Patient Position:    Lying   Pulse:  80  80   Resp:   22 20   Temp:    98.9 °F (37.2 °C)   TempSrc:    Oral   SpO2:  100%  100%   Weight: 72.6 kg (160 lb 0.9 oz)   65.8 kg (145 lb)   Height:    175.3 cm (69\")       Physical Exam   Constitutional: He is oriented to person, place, and time. He appears well-developed and well-nourished. He appears distressed.   In obvious distress due to pain   HENT:   Head: Normocephalic and atraumatic. Hair is normal.   Mouth/Throat: Oropharynx is clear and moist.   Eyes: Conjunctivae, EOM and lids are normal. Pupils are equal.   Neck: Neck supple. No JVD present. No thyroid mass and no thyromegaly present.   Cardiovascular: Normal rate and regular rhythm. Exam reveals no gallop and no friction rub.   No murmur heard.  Pulmonary/Chest: Effort normal and breath sounds normal. No respiratory distress. He has no wheezes. He has no rales.   Abdominal: Soft. He exhibits no distension and no mass. There is no splenomegaly or hepatomegaly. There is no tenderness. There is no guarding.   Musculoskeletal: Normal range of motion. He exhibits no edema, tenderness or deformity.   Lymphadenopathy:     He has no cervical adenopathy.     He has no axillary adenopathy.        Right: No inguinal and no supraclavicular adenopathy present.        Left: No inguinal and no supraclavicular adenopathy present.   Neurological: He is alert and oriented to person, place, and time. He has normal strength.   Skin: Skin is warm and dry. No rash noted.   Psychiatric: He has a normal mood and affect. His behavior is normal. Judgment and thought content normal. Cognition and memory are normal.   Nursing note and vitals reviewed.      DIAGNOSTIC DATA:  WBC   Date Value Ref Range Status   05/14/2020 13.87 (H) 3.40 - 10.80 10*3/mm3 Final     RBC   Date Value Ref Range Status   05/14/2020 4.93 4.14 - 5.80 10*6/mm3 Final     Hemoglobin   Date Value Ref " Range Status   05/14/2020 15.3 13.0 - 17.7 g/dL Final     Hematocrit   Date Value Ref Range Status   05/14/2020 44.2 37.5 - 51.0 % Final     MCV   Date Value Ref Range Status   05/14/2020 89.7 79.0 - 97.0 fL Final     MCH   Date Value Ref Range Status   05/14/2020 31.0 26.6 - 33.0 pg Final     MCHC   Date Value Ref Range Status   05/14/2020 34.6 31.5 - 35.7 g/dL Final     RDW   Date Value Ref Range Status   05/14/2020 12.7 12.3 - 15.4 % Final     RDW-SD   Date Value Ref Range Status   05/14/2020 41.6 37.0 - 54.0 fl Final     MPV   Date Value Ref Range Status   05/14/2020 9.1 6.0 - 12.0 fL Final     Platelets   Date Value Ref Range Status   05/14/2020 212 140 - 450 10*3/mm3 Final     Neutrophil %   Date Value Ref Range Status   05/14/2020 84.7 (H) 42.7 - 76.0 % Final     Lymphocyte %   Date Value Ref Range Status   05/14/2020 8.7 (L) 19.6 - 45.3 % Final     Monocyte %   Date Value Ref Range Status   05/14/2020 6.1 5.0 - 12.0 % Final     Eosinophil %   Date Value Ref Range Status   05/14/2020 0.1 (L) 0.3 - 6.2 % Final     Basophil %   Date Value Ref Range Status   05/14/2020 0.1 0.0 - 1.5 % Final     Immature Grans %   Date Value Ref Range Status   05/14/2020 0.3 0.0 - 0.5 % Final     Neutrophils, Absolute   Date Value Ref Range Status   05/14/2020 11.75 (H) 1.70 - 7.00 10*3/mm3 Final     Lymphocytes, Absolute   Date Value Ref Range Status   05/14/2020 1.21 0.70 - 3.10 10*3/mm3 Final     Monocytes, Absolute   Date Value Ref Range Status   05/14/2020 0.85 0.10 - 0.90 10*3/mm3 Final     Eosinophils, Absolute   Date Value Ref Range Status   05/14/2020 0.01 0.00 - 0.40 10*3/mm3 Final     Basophils, Absolute   Date Value Ref Range Status   05/14/2020 0.01 0.00 - 0.20 10*3/mm3 Final     Immature Grans, Absolute   Date Value Ref Range Status   05/14/2020 0.04 0.00 - 0.05 10*3/mm3 Final     nRBC   Date Value Ref Range Status   05/14/2020 0.0 0.0 - 0.2 /100 WBC Final       Glucose   Date Value Ref Range Status   05/14/2020 105  (H) 65 - 99 mg/dL Final     Sodium   Date Value Ref Range Status   05/14/2020 137 136 - 145 mmol/L Final     Potassium   Date Value Ref Range Status   05/14/2020 3.8 3.5 - 5.2 mmol/L Final     CO2   Date Value Ref Range Status   05/14/2020 26.9 22.0 - 29.0 mmol/L Final     Chloride   Date Value Ref Range Status   05/14/2020 98 98 - 107 mmol/L Final     Anion Gap   Date Value Ref Range Status   05/14/2020 12.1 5.0 - 15.0 mmol/L Final     Creatinine   Date Value Ref Range Status   05/14/2020 1.00 0.76 - 1.27 mg/dL Final     BUN   Date Value Ref Range Status   05/14/2020 6 6 - 20 mg/dL Final     BUN/Creatinine Ratio   Date Value Ref Range Status   05/14/2020 6.0 (L) 7.0 - 25.0 Final     Calcium   Date Value Ref Range Status   05/14/2020 9.6 8.6 - 10.5 mg/dL Final     eGFR Non  Amer   Date Value Ref Range Status   05/14/2020 94 >60 mL/min/1.73 Final     Alkaline Phosphatase   Date Value Ref Range Status   05/14/2020 74 39 - 117 U/L Final     Total Protein   Date Value Ref Range Status   05/14/2020 7.8 6.0 - 8.5 g/dL Final     ALT (SGPT)   Date Value Ref Range Status   05/14/2020 48 (H) 1 - 41 U/L Final     AST (SGOT)   Date Value Ref Range Status   05/14/2020 20 1 - 40 U/L Final     Total Bilirubin   Date Value Ref Range Status   05/14/2020 1.1 0.2 - 1.2 mg/dL Final     Albumin   Date Value Ref Range Status   05/14/2020 4.60 3.50 - 5.20 g/dL Final     Globulin   Date Value Ref Range Status   05/14/2020 3.2 gm/dL Final         IMAGING:  CT chest abd pelvis 5/14:  No malignancy.  Bilateral PE.  Images personally reviewed.      Assessment/Plan   ASSESSMENT:  This is a 19 y.o. male with:    1. Bilateral lower lobe pulmonary emboli  · Unprovoked.   No prior leg immobilization.  · He presented on 5/13/2020 with sharp right-sided lower chest pain  · CT angiogram 5/13/2020 with bilateral lower lobe pulmonary emboli with evolving infarction in the right lower lung.  · D-dimer 3.24  · Baseline PTT 38.3 seconds, slightly  prolonged.   · Baseline INR 1.50.    · No malignancy on CT imaging chest abdomen pelvis 5/13/2020  · He has been started on a heparin drip  · Will send the laboratory thrombophilia evaluation  2. Chest pain as a result of #1  · Morphine not helping  · Received Dilaudid and Toradol in the ER which he states helped  · Would like to avoid too much NSAID use with anticoagulation  · Start methylprednisolone 60 mg bid  · Increase frequency of morphine and add oxycodone 10 mg q4h prn    RECOMMENDATIONS/PLAN:   *Agree with heparin drip for now  *Today we discussed sending a laboratory thrombophilia evaluation to determine if there are any inherited or acquired reasons for venous thromboembolism.  We will send factor V Leiden, prothrombin gene mutation, anti-thrombin level, protein C and S levels, lupus anticoagulant, anti-cardiolipin and anti-beta 2 glycoprotein 1 antibodies and a factor VIII activity level.  Labs requested  *Pain medication adjustment as above  *Add methylprednisolone 60 mg every 12 hours to help with pain  *Bilateral lower extremity venous duplex    Will follow.  Thanks for allowing me to see this nice gentleman in consultation.      Tom Serrato MD

## 2020-05-14 NOTE — ED PROVIDER NOTES
EMERGENCY DEPARTMENT ENCOUNTER    Room Number:  03/03  Date of encounter:  5/14/2020  PCP: Provider, No Known  Historian: Patient      HPI:  Chief Complaint: Chest pain  A complete HPI/ROS/PMH/PSH/SH/FH are unobtainable due to: Nothing    Context: Jourdan Dumont is a 19 y.o. male who presents to the ED c/o 2-day history of constant, worsening right-sided chest pain.  Patient states this patient started suddenly yesterday.  Patient denies any recent trauma or falls.  Patient was seen in the emergency room last night for the same complaint.  He was ultimately diagnosed with bilateral PEs with pulmonary infarct in the right lung base.  Patient states his pain has become worse.  He in addition states that shortness of breath has worse as well.  He denies any previous history of DVT/PE.  He denies any significant family history of clotting disorders.  He denies any long car rides or plane trips.    Review of Medical Records  Reviewed ER visit from yesterday, as well as CT scan showing bilateral pulmonary emboli.  Patient was treated with Eliquis in the ER last night, patient states he also took another dose at approximately 430 this morning.    CT Chest: Bilateral lower lobe pulmonary thromboemboli, larger and  more numerous on the right than the left with a developing area of  pulmonary infarction in the posterior aspect of the right lung base.    PAST MEDICAL HISTORY  Active Ambulatory Problems     Diagnosis Date Noted   • No Active Ambulatory Problems     Resolved Ambulatory Problems     Diagnosis Date Noted   • No Resolved Ambulatory Problems     No Additional Past Medical History         PAST SURGICAL HISTORY  No past surgical history on file.      FAMILY HISTORY  No family history on file.      SOCIAL HISTORY  Social History     Socioeconomic History   • Marital status: Single     Spouse name: Not on file   • Number of children: Not on file   • Years of education: Not on file   • Highest education level: Not on file    Tobacco Use   • Smoking status: Current Every Day Smoker   • Smokeless tobacco: Never Used   Substance and Sexual Activity   • Alcohol use: Yes         ALLERGIES  Patient has no known allergies.        REVIEW OF SYSTEMS  All systems reviewed and negative except for those discussed in HPI.       PHYSICAL EXAM    I have reviewed the triage vital signs and nursing notes.    ED Triage Vitals   Temp Heart Rate Resp BP SpO2   05/14/20 0517 05/14/20 0517 05/14/20 0517 05/14/20 0609 05/14/20 0517   98.5 °F (36.9 °C) 102 20 130/74 99 %      Temp src Heart Rate Source Patient Position BP Location FiO2 (%)   05/14/20 0517 05/14/20 0517 -- -- --   Tympanic Monitor          Physical Exam  GENERAL: Mild distress secondary to pain   HENT: nares patent, head atraumatic  EYES: no scleral icterus, EOMI  CV: regular rhythm, regular rate, no murmur  RESPIRATORY: normal effort, CTA, mild right anterior chest wall tenderness  ABDOMEN: soft, nontender  MUSCULOSKELETAL: no deformity, no calf swelling or tenderness  NEURO: alert, moves all extremities, follows commands  SKIN: warm, dry        LAB RESULTS  Recent Results (from the past 24 hour(s))   Comprehensive Metabolic Panel    Collection Time: 05/13/20  7:31 PM   Result Value Ref Range    Glucose 104 (H) 65 - 99 mg/dL    BUN 6 6 - 20 mg/dL    Creatinine 0.99 0.76 - 1.27 mg/dL    Sodium 137 136 - 145 mmol/L    Potassium 3.7 3.5 - 5.2 mmol/L    Chloride 98 98 - 107 mmol/L    CO2 27.4 22.0 - 29.0 mmol/L    Calcium 10.0 8.6 - 10.5 mg/dL    Total Protein 8.1 6.0 - 8.5 g/dL    Albumin 4.70 3.50 - 5.20 g/dL    ALT (SGPT) 59 (H) 1 - 41 U/L    AST (SGOT) 26 1 - 40 U/L    Alkaline Phosphatase 77 39 - 117 U/L    Total Bilirubin 0.8 0.2 - 1.2 mg/dL    eGFR Non African Amer 95 >60 mL/min/1.73    eGFR  African Amer 116 >60 mL/min/1.73    Globulin 3.4 gm/dL    A/G Ratio 1.4 g/dL    BUN/Creatinine Ratio 6.1 (L) 7.0 - 25.0    Anion Gap 11.6 5.0 - 15.0 mmol/L   Lipase    Collection Time: 05/13/20  7:31  PM   Result Value Ref Range    Lipase 12 (L) 13 - 60 U/L   CBC Auto Differential    Collection Time: 05/13/20  7:31 PM   Result Value Ref Range    WBC 12.65 (H) 3.40 - 10.80 10*3/mm3    RBC 4.97 4.14 - 5.80 10*6/mm3    Hemoglobin 15.1 13.0 - 17.7 g/dL    Hematocrit 44.5 37.5 - 51.0 %    MCV 89.5 79.0 - 97.0 fL    MCH 30.4 26.6 - 33.0 pg    MCHC 33.9 31.5 - 35.7 g/dL    RDW 12.3 12.3 - 15.4 %    RDW-SD 39.9 37.0 - 54.0 fl    MPV 9.1 6.0 - 12.0 fL    Platelets 217 140 - 450 10*3/mm3    Neutrophil % 83.0 (H) 42.7 - 76.0 %    Lymphocyte % 10.0 (L) 19.6 - 45.3 %    Monocyte % 6.3 5.0 - 12.0 %    Eosinophil % 0.2 (L) 0.3 - 6.2 %    Basophil % 0.1 0.0 - 1.5 %    Immature Grans % 0.4 0.0 - 0.5 %    Neutrophils, Absolute 10.50 (H) 1.70 - 7.00 10*3/mm3    Lymphocytes, Absolute 1.27 0.70 - 3.10 10*3/mm3    Monocytes, Absolute 0.80 0.10 - 0.90 10*3/mm3    Eosinophils, Absolute 0.02 0.00 - 0.40 10*3/mm3    Basophils, Absolute 0.01 0.00 - 0.20 10*3/mm3    Immature Grans, Absolute 0.05 0.00 - 0.05 10*3/mm3    nRBC 0.0 0.0 - 0.2 /100 WBC   Light Blue Top    Collection Time: 05/13/20  7:31 PM   Result Value Ref Range    Extra Tube hold for add-on    Green Top (Gel)    Collection Time: 05/13/20  7:31 PM   Result Value Ref Range    Extra Tube Hold for add-ons.    Lavender Top    Collection Time: 05/13/20  7:31 PM   Result Value Ref Range    Extra Tube hold for add-on    Gold Top - SST    Collection Time: 05/13/20  7:31 PM   Result Value Ref Range    Extra Tube Hold for add-ons.    D-dimer, Quantitative    Collection Time: 05/13/20  7:31 PM   Result Value Ref Range    D-Dimer, Quantitative 3.24 (H) 0.00 - 0.49 MCGFEU/mL   BNP    Collection Time: 05/13/20  7:31 PM   Result Value Ref Range    proBNP 31.0 5.0 - 450.0 pg/mL   Troponin    Collection Time: 05/13/20  7:31 PM   Result Value Ref Range    Troponin T <0.010 0.000 - 0.030 ng/mL       Ordered the above labs and independently reviewed the results.        RADIOLOGY  Ct Abdomen Pelvis  With Contrast    Result Date: 5/13/2020  CT ABDOMEN PELVIS W CONTRAST-  CLINICAL HISTORY: Pleuritic chest pain  TECHNIQUE: Spiral CT images were acquired through the abdomen and pelvis with IV contrast only and were reconstructed in 3 mm thick slices.  Radiation dose reduction techniques were utilized, including automated exposure control and exposure modulation based on body size.  COMPARISON: None  FINDINGS: The liver, spleen, pancreas, kidneys, and adrenal glands are unremarkable. The stomach and small and large bowel appear within normal limits. There is no bowel distention. There are no hernias. Images through the lung bases demonstrate bilateral lower lobe pulmonary thromboemboli and also an infiltrative the right lung base posteriorly that is quite likely an area of pulmonary infarction.      Lung findings as noted. No acute process is identified within the abdomen and pelvis.  Comment: Note that there are no findings on the CT of the chest would suggest significant right ventricular strain. The RV LV ratio is 1.0  This report was finalized on 5/13/2020 10:44 PM by Dr. Raheel Pedroza M.D.      Xr Chest 1 View    Result Date: 5/13/2020  XR CHEST 1 VW-  5/13/2020  HISTORY: Chest pain.  Heart size is within normal limits. Lungs appear free of acute infiltrates. Bones and soft tissues are unremarkable.      1. No acute process.  This report was finalized on 5/13/2020 7:12 PM by Dr. Ranjan Christiansen M.D.      Ct Angiogram Chest    Result Date: 5/13/2020  CT ANGIOGRAM CHEST-  CLINICAL HISTORY: Pleuritic chest pain. Evaluate for pulmonary embolism.  TECHNIQUE: Spiral CT images were obtained through the chest during rapid IV injection of contrast and were reconstructed in 2 mm thick axial slices. Multiple coronal and sagittal and 3-D reconstructions were produced.  Radiation dose reduction techniques were utilized, including automated exposure control and exposure modulation based on body size.  COMPARISON: None   FINDINGS: The main pulmonary arteries and their lobar and segmental branches are well opacified. There are moderate-sized filling defects within the right lower lobe pulmonary artery extending into multiple segmental and subsegmental branches within the right lower lobe consistent with pulmonary thromboemboli. A few small pulmonary thromboemboli are also present within segmental and subsegmental branches of the left lower lobe pulmonary artery. No other filling defects are identified. Lung window images demonstrate localized airspace infiltrate in the posterior and inferior aspect of the right lung base that is quite likely a developing area of pulmonary infarction. No other infiltrates are identified. There are no pleural effusions. There is no mediastinal hilar or axillary lymphadenopathy.      Bilateral lower lobe pulmonary thromboemboli, larger and more numerous on the right than the left with a developing area of pulmonary infarction in the posterior aspect of the right lung base.  This report was finalized on 5/13/2020 10:39 PM by Dr. Raheel Pedroza M.D.        I ordered the above noted radiological studies. Reviewed by me and discussed with radiologist.  See dictation for official radiology interpretation.      MEDICATIONS GIVEN IN ER    Medications   sodium chloride 0.9 % flush 10 mL (has no administration in time range)   morphine injection 4 mg (has no administration in time range)   ondansetron (ZOFRAN) injection 4 mg (has no administration in time range)         PROGRESS, DATA ANALYSIS, CONSULTS, AND MEDICAL DECISION MAKING    All labs have been independently reviewed by me.  All radiology studies have been reviewed by me and discussed with radiologist dictating the report.   EKG's independently viewed and interpreted by me.  Discussion below represents my analysis of pertinent findings related to patient's condition, differential diagnosis, treatment plan and final disposition.    I have discussed case  with Dr. Albrecht, emergency room physician.  He has performed his own bedside examination and agrees with treatment plan.    ED Course as of May 14 1348   Thu May 14, 2020   0612 Patient presents to ER with right-sided chest pain since yesterday.  Patient was seen in ER and diagnosed with bilateral PEs.  He says the pain is worse and brought him to the ER.  Patient slightly tachycardic, sats 99% on room air.  Plan for pain control and reevaluation.    [EE]   0644 EKG interpreted by myself.  Time 0622.  Sinus rhythm, 89 bpm.  Normal P/MARYANA.  QRS shows normal axis.  No significant ST abnormalities.  No significant changes since 5/13/2020.    [EE]   0701 proBNP: 76.0 [EE]   0701 Troponin T: <0.010 [EE]   0701 WBC(!): 13.87 [EE]   0715 Plan to admit patient for further evaluation.    [EE]   0731 I discussed pt with Dr. Jones.  She will admit for further care.  She would like me to start heparin.     [EE]   0737 I discussed heparin treatment with pharmacist.  Ideally patient should wait until his next scheduled dose of Eliquis prior to starting heparin.  This will be at approximately 4 PM.  Patient has stable vitals, I believe this is reasonable.  I will put orders in accordingly.    [EE]      ED Course User Index  [EE] Philipp Cheatham PA       AS OF 06:13 VITALS:    BP - 130/74  HR - 92  TEMP - 98.5 °F (36.9 °C) (Tympanic)  O2 SATS - 99%        DIAGNOSIS  Final diagnoses:   Bilateral pulmonary embolism (CMS/HCC)   Pulmonary infarct (CMS/HCC)         DISPOSITION  Discharged             Philipp Cheatham PA  05/14/20 1311

## 2020-05-14 NOTE — H&P
HISTORY AND PHYSICAL   [facility]        Patient Identification:  Name: Jourdan Dumont  Age: 19 y.o.  Sex: male  :  2000  MRN: [id number]                     Primary Care Physician: Provider, No Known    Chief Complaint:  19 year old gentleman who presented to the emergency room with chest pain; he had been seen yesterday evening and was diagnosed with bilateral pulmonary emboli; he returned a few hours later due to continued uncontrolled chest pain; he denies any family history of blood clots; he is working and active and denies any prolonged period of inactivity    History of Present Illness:   As above    Past Medical History:  History reviewed. No pertinent past medical history.  Past Surgical History:  History reviewed. No pertinent surgical history.   Home Meds:  Medications Prior to Admission   Medication Sig Dispense Refill Last Dose   • Apixaban (ELIQUIS DVT/PE STARTER PACK) tablet Take two 5 mg tablets by mouth every 12 hours for 7 days. Followed by one 5 mg tablet every 12 hours. (Dispense starter pack if available) 74 tablet 0    • HYDROcodone-acetaminophen (NORCO) 5-325 MG per tablet Take 1 tablet by mouth Every 6 (Six) Hours As Needed for Moderate Pain  for up to 2 days. 8 tablet 0        Allergies:  No Known Allergies  Immunizations:    There is no immunization history on file for this patient.  Social History:   Social History     Social History Narrative   • Not on file     Social History     Socioeconomic History   • Marital status: Single     Spouse name: Not on file   • Number of children: Not on file   • Years of education: Not on file   • Highest education level: Not on file   Tobacco Use   • Smoking status: Current Every Day Smoker   • Smokeless tobacco: Never Used   Substance and Sexual Activity   • Alcohol use: Yes       Family History:  History reviewed. No pertinent family history.     Review of Systems  See history of present illness and past medical history.   "Patient denies headache, dizziness, syncope, falls, trauma, change in vision, change in hearing, change in taste, changes in weight, changes in appetite, focal weakness, numbness, or paresthesia.  Patient denies chest pain, palpitations, dyspnea, orthopnea, PND, cough, sinus pressure, rhinorrhea, epistaxis, hemoptysis, nausea, vomiting,hematemesis, diarrhea, constipation or hematchezia.  Denies cold or heat intolerance, polydipsia, polyuria, polyphagia. Denies hematuria, pyuria, dysuria, hesitancy, frequency or urgency. Denies consumption of raw and under cooked meats foods or change in water source.  Denies fever, chills, sweats, night sweats.  Denies missing any routine medications. Remainder of ROS is negative.    Objective:  tMax 24 hrs: Temp (24hrs), Av.9 °F (37.2 °C), Min:97.8 °F (36.6 °C), Max:100.8 °F (38.2 °C)    Vitals Ranges:   Temp:  [97.8 °F (36.6 °C)-100.8 °F (38.2 °C)] 98.9 °F (37.2 °C)  Heart Rate:  [] 80  Resp:  [14-22] 20  BP: (108-147)/(56-90) 123/88      Exam:  /88 (BP Location: Left arm, Patient Position: Lying)   Pulse 80   Temp 98.9 °F (37.2 °C) (Oral)   Resp 20   Ht 175.3 cm (69\")   Wt 65.8 kg (145 lb)   SpO2 100%   BMI 21.41 kg/m²     General Appearance:    Alert, cooperative, no distress, appears stated age   Head:    Normocephalic, without obvious abnormality, atraumatic   Eyes:    PERRL, conjunctiva/corneas clear, EOM's intact, both eyes   Ears:    Normal external ear canals, both ears   Nose:   Nares normal, septum midline, mucosa normal, no drainage    or sinus tenderness   Throat:   Lips, mucosa, and tongue normal   Neck:   Supple, symmetrical, trachea midline, no adenopathy;     thyroid:  no enlargement/tenderness/nodules; no carotid    bruit or JVD   Back:     Symmetric, no curvature, ROM normal, no CVA tenderness   Lungs:     Decreased breath sounds bilaterally, respirations unlabored   Chest Wall:    No tenderness or deformity    Heart:    Regular rate and " rhythm, S1 and S2 normal, no murmur, rub   or gallop   Abdomen:     Soft, non-tender, bowel sounds active all four quadrants,     no masses, no hepatomegaly, no splenomegaly   Extremities:   Extremities normal, atraumatic, no cyanosis or edema   Pulses:   2+ and symmetric all extremities   Skin:   Skin color, texture, turgor normal, no rashes or lesions   Lymph nodes:   Cervical, supraclavicular, and axillary nodes normal   Neurologic:   CNII-XII intact, normal strength, sensation intact throughout      .    Data Review:  Labs in chart were reviewed.  WBC   Date Value Ref Range Status   05/14/2020 13.87 (H) 3.40 - 10.80 10*3/mm3 Final     Hemoglobin   Date Value Ref Range Status   05/14/2020 15.3 13.0 - 17.7 g/dL Final     Hematocrit   Date Value Ref Range Status   05/14/2020 44.2 37.5 - 51.0 % Final     Platelets   Date Value Ref Range Status   05/14/2020 212 140 - 450 10*3/mm3 Final     Sodium   Date Value Ref Range Status   05/14/2020 137 136 - 145 mmol/L Final     Potassium   Date Value Ref Range Status   05/14/2020 3.8 3.5 - 5.2 mmol/L Final     Chloride   Date Value Ref Range Status   05/14/2020 98 98 - 107 mmol/L Final     CO2   Date Value Ref Range Status   05/14/2020 26.9 22.0 - 29.0 mmol/L Final     BUN   Date Value Ref Range Status   05/14/2020 6 6 - 20 mg/dL Final     Creatinine   Date Value Ref Range Status   05/14/2020 1.00 0.76 - 1.27 mg/dL Final     Glucose   Date Value Ref Range Status   05/14/2020 105 (H) 65 - 99 mg/dL Final     Calcium   Date Value Ref Range Status   05/14/2020 9.6 8.6 - 10.5 mg/dL Final     AST (SGOT)   Date Value Ref Range Status   05/14/2020 20 1 - 40 U/L Final     ALT (SGPT)   Date Value Ref Range Status   05/14/2020 48 (H) 1 - 41 U/L Final     Alkaline Phosphatase   Date Value Ref Range Status   05/14/2020 74 39 - 117 U/L Final     INR   Date Value Ref Range Status   05/14/2020 1.50 (H) 0.90 - 1.10 Final                Imaging Results (All)     None        Patient Active  Problem List   Diagnosis Code   • Bilateral pulmonary embolism (CMS/HCC) I26.99       Assessment:    Bilateral pulmonary embolism (CMS/HCC)  chest pain  Leukocytosis  hyperglycemia  Plan:  Heparin drip started  Hold eliquis for now  Ask hematology to see him  Trend labs  D/w patient and nurse  Leticia Jones MD  5/14/2020  13:32

## 2020-05-14 NOTE — PLAN OF CARE
New admit this shift from ED for Bilateral Pulmonary embolisms. Positive blood clot to left leg. Dr. Serrato paged for results-Awaiting response. Pain medications adjusted to meet pain goal, See MAR. Heparin drip started. Will CTM.

## 2020-05-14 NOTE — PROGRESS NOTES
BLE Venous doppler study complete. Preliminary RLE is negative for DVT and LLE is positive for acute calf DVT called to Dena RN

## 2020-05-14 NOTE — PROGRESS NOTES
Discharge Planning Assessment  [facility]     Patient Name: Jourdan Dumont  MRN: [id number]  Today's Date: 5/13/2020    Admit Date: 5/13/2020    Discharge Needs Assessment    No documentation.       Discharge Plan     Row Name 05/13/20 8127       Plan    Plan  Patient does not have a PCP.  Instructed to call the number on the back of his card to get a new PCP.  List of clinics that that he can receive care from also given prior to discharge.  Pt. verbalized understanding and had no further questions.    Final Discharge Disposition Code  01 - home or self-care        Destination      Coordination has not been started for this encounter.      Durable Medical Equipment      Coordination has not been started for this encounter.      Dialysis/Infusion      Coordination has not been started for this encounter.      Home Medical Care      Coordination has not been started for this encounter.      Therapy      Coordination has not been started for this encounter.      Community Resources      Coordination has not been started for this encounter.          Demographic Summary    No documentation.       Functional Status    No documentation.       Psychosocial    No documentation.       Abuse/Neglect    No documentation.       Legal    No documentation.       Substance Abuse    No documentation.       Patient Forms     Row Name 05/13/20 2814       Patient Forms    Provider Choice List  Delivered Clinic list    Delivered to  Patient    Method of delivery  In person            Archana Acevedo RN

## 2020-05-15 LAB
ANION GAP SERPL CALCULATED.3IONS-SCNC: 16.3 MMOL/L (ref 5–15)
APTT PPP: 73.9 SECONDS (ref 22.7–35.4)
AT III PPP CHRO-ACNC: 115 % (ref 90–134)
B2 GLYCOPROT1 IGA SER-ACNC: <9 GPI IGA UNITS (ref 0–25)
B2 GLYCOPROT1 IGG SER-ACNC: <9 GPI IGG UNITS (ref 0–20)
B2 GLYCOPROT1 IGM SER-ACNC: <9 GPI IGM UNITS (ref 0–32)
BUN BLD-MCNC: 10 MG/DL (ref 6–20)
BUN/CREAT SERPL: 12 (ref 7–25)
CALCIUM SPEC-SCNC: 9.5 MG/DL (ref 8.6–10.5)
CARDIOLIPIN IGG SER IA-ACNC: <9 GPL U/ML (ref 0–14)
CARDIOLIPIN IGM SER IA-ACNC: <9 MPL U/ML (ref 0–12)
CHLORIDE SERPL-SCNC: 93 MMOL/L (ref 98–107)
CO2 SERPL-SCNC: 25.7 MMOL/L (ref 22–29)
CREAT BLD-MCNC: 0.83 MG/DL (ref 0.76–1.27)
DEPRECATED RDW RBC AUTO: 42.8 FL (ref 37–54)
ERYTHROCYTE [DISTWIDTH] IN BLOOD BY AUTOMATED COUNT: 12.6 % (ref 12.3–15.4)
F5 GENE MUT ANL BLD/T: NORMAL
FACT VIII ACT/NOR PPP: 283 % ACTIVITY (ref 60–150)
FACTOR II, DNA ANALYSIS: NORMAL
GFR SERPL CREATININE-BSD FRML MDRD: 117 ML/MIN/1.73
GFR SERPL CREATININE-BSD FRML MDRD: 142 ML/MIN/1.73
GLUCOSE BLD-MCNC: 124 MG/DL (ref 65–99)
HCT VFR BLD AUTO: 44 % (ref 37.5–51)
HGB BLD-MCNC: 14.9 G/DL (ref 13–17.7)
Lab: ABNORMAL
MAGNESIUM SERPL-MCNC: 2.3 MG/DL (ref 1.6–2.6)
MCH RBC QN AUTO: 31 PG (ref 26.6–33)
MCHC RBC AUTO-ENTMCNC: 33.9 G/DL (ref 31.5–35.7)
MCV RBC AUTO: 91.7 FL (ref 79–97)
PLATELET # BLD AUTO: 241 10*3/MM3 (ref 140–450)
PMV BLD AUTO: 9.2 FL (ref 6–12)
POTASSIUM BLD-SCNC: 4.5 MMOL/L (ref 3.5–5.2)
PROT C ACT/NOR PPP: 99 % (ref 86–163)
PROT S ACT/NOR PPP: 65 % (ref 70–127)
RBC # BLD AUTO: 4.8 10*6/MM3 (ref 4.14–5.8)
SARS-COV-2 RNA RESP QL NAA+PROBE: NOT DETECTED
SODIUM BLD-SCNC: 135 MMOL/L (ref 136–145)
WBC NRBC COR # BLD: 14.48 10*3/MM3 (ref 3.4–10.8)

## 2020-05-15 PROCEDURE — 83735 ASSAY OF MAGNESIUM: CPT | Performed by: INTERNAL MEDICINE

## 2020-05-15 PROCEDURE — 85027 COMPLETE CBC AUTOMATED: CPT | Performed by: INTERNAL MEDICINE

## 2020-05-15 PROCEDURE — 25010000002 HEPARIN (PORCINE) PER 1000 UNITS: Performed by: PHYSICIAN ASSISTANT

## 2020-05-15 PROCEDURE — 25010000002 METHYLPREDNISOLONE PER 125 MG: Performed by: INTERNAL MEDICINE

## 2020-05-15 PROCEDURE — 25010000002 MORPHINE PER 10 MG: Performed by: INTERNAL MEDICINE

## 2020-05-15 PROCEDURE — 85730 THROMBOPLASTIN TIME PARTIAL: CPT | Performed by: INTERNAL MEDICINE

## 2020-05-15 PROCEDURE — 80048 BASIC METABOLIC PNL TOTAL CA: CPT | Performed by: INTERNAL MEDICINE

## 2020-05-15 PROCEDURE — 99232 SBSQ HOSP IP/OBS MODERATE 35: CPT | Performed by: INTERNAL MEDICINE

## 2020-05-15 RX ORDER — METHYLPREDNISOLONE SODIUM SUCCINATE 125 MG/2ML
60 INJECTION, POWDER, LYOPHILIZED, FOR SOLUTION INTRAMUSCULAR; INTRAVENOUS 2 TIMES DAILY
Status: COMPLETED | OUTPATIENT
Start: 2020-05-15 | End: 2020-05-15

## 2020-05-15 RX ADMIN — METHYLPREDNISOLONE SODIUM SUCCINATE 60 MG: 125 INJECTION, POWDER, FOR SOLUTION INTRAMUSCULAR; INTRAVENOUS at 09:39

## 2020-05-15 RX ADMIN — OXYCODONE HYDROCHLORIDE 10 MG: 5 TABLET ORAL at 23:47

## 2020-05-15 RX ADMIN — HEPARIN SODIUM 22 UNITS/KG/HR: 10000 INJECTION, SOLUTION INTRAVENOUS at 10:48

## 2020-05-15 RX ADMIN — OXYCODONE HYDROCHLORIDE 10 MG: 5 TABLET ORAL at 19:52

## 2020-05-15 RX ADMIN — SODIUM CHLORIDE, PRESERVATIVE FREE 10 ML: 5 INJECTION INTRAVENOUS at 09:39

## 2020-05-15 RX ADMIN — OXYCODONE HYDROCHLORIDE 10 MG: 5 TABLET ORAL at 01:58

## 2020-05-15 RX ADMIN — HEPARIN SODIUM 22 UNITS/KG/HR: 10000 INJECTION, SOLUTION INTRAVENOUS at 07:30

## 2020-05-15 RX ADMIN — OXYCODONE HYDROCHLORIDE 10 MG: 5 TABLET ORAL at 14:30

## 2020-05-15 RX ADMIN — METHYLPREDNISOLONE SODIUM SUCCINATE 60 MG: 125 INJECTION, POWDER, FOR SOLUTION INTRAMUSCULAR; INTRAVENOUS at 20:55

## 2020-05-15 RX ADMIN — OXYCODONE HYDROCHLORIDE 10 MG: 5 TABLET ORAL at 06:05

## 2020-05-15 RX ADMIN — MORPHINE SULFATE 4 MG: 2 INJECTION, SOLUTION INTRAMUSCULAR; INTRAVENOUS at 18:12

## 2020-05-15 RX ADMIN — MORPHINE SULFATE 4 MG: 2 INJECTION, SOLUTION INTRAMUSCULAR; INTRAVENOUS at 07:30

## 2020-05-15 NOTE — PROGRESS NOTES
"DAILY PROGRESS NOTE  [facility]    Patient Identification:  Name: Jourdan Dumont  Age: 19 y.o.  Sex: male  :  2000  MRN: [id number]         Primary Care Physician: Provider, No Known    Subjective: patient is improving; pain is a little better  Interval History: follow up for bilateral pe, chest pain, hyperglycemia  Objective:    Scheduled Meds:  methylPREDNISolone sodium succinate 60 mg Intravenous BID     Continuous Infusions:  heparin (porcine) 18 Units/kg/hr Last Rate: 22 Units/kg/hr (05/15/20 1048)       Vital signs in last 24 hours:  Temp:  [98.2 °F (36.8 °C)-99 °F (37.2 °C)] 98.2 °F (36.8 °C)  Heart Rate:  [76-84] 76  Resp:  [16-18] 18  BP: (115-124)/(64-74) 115/65    Intake/Output:    Intake/Output Summary (Last 24 hours) at 5/15/2020 1451  Last data filed at 5/15/2020 1411  Gross per 24 hour   Intake 300 ml   Output --   Net 300 ml       Exam:  /65 (BP Location: Left arm, Patient Position: Lying)   Pulse 76   Temp 98.2 °F (36.8 °C) (Oral)   Resp 18   Ht 175.3 cm (69\")   Wt 63.5 kg (140 lb)   SpO2 98%   BMI 20.67 kg/m²     General Appearance:    Alert, cooperative, no distress, AAOx3                          Head:    Normocephalic, without obvious abnormality, atraumatic                           Eyes:    PERRL, conjunctiva/corneas clear, EOM's intact, both eyes                         Throat:   Lips, tongue, gums normal; oral mucosa pink and moist                           Neck:   Supple, symmetrical, trachea midline, no JVD                         Lungs:    Decreased breath sounds bilaterally, respirations unlabored                 Chest Wall:    No tenderness or deformity                          Heart:    Regular rate and rhythm, S1 and S2 normal, no murmur,no  Rub                                      or gallop                  Abdomen:     Soft, non-tender, bowel sounds active, no masses, no                                                        organomegaly            "       Extremities:   Extremities normal, atraumatic, no cyanosis or edema                        Pulses:   Pulses palpable in all extremities                            Skin:   Skin is warm and dry,  no rashes or palpable lesions                  Neurologic:   CNII-XII intact, motor strength grossly intact, sensation grossly                                         intact to light touch, no focal deficits noted       Data Review:  Labs in chart were reviewed.  WBC   Date Value Ref Range Status   05/15/2020 14.48 (H) 3.40 - 10.80 10*3/mm3 Final     Hemoglobin   Date Value Ref Range Status   05/15/2020 14.9 13.0 - 17.7 g/dL Final     Hematocrit   Date Value Ref Range Status   05/15/2020 44.0 37.5 - 51.0 % Final     Platelets   Date Value Ref Range Status   05/15/2020 241 140 - 450 10*3/mm3 Final     Sodium   Date Value Ref Range Status   05/15/2020 135 (L) 136 - 145 mmol/L Final     Potassium   Date Value Ref Range Status   05/15/2020 4.5 3.5 - 5.2 mmol/L Final     Chloride   Date Value Ref Range Status   05/15/2020 93 (L) 98 - 107 mmol/L Final     CO2   Date Value Ref Range Status   05/15/2020 25.7 22.0 - 29.0 mmol/L Final     BUN   Date Value Ref Range Status   05/15/2020 10 6 - 20 mg/dL Final     Creatinine   Date Value Ref Range Status   05/15/2020 0.83 0.76 - 1.27 mg/dL Final     Glucose   Date Value Ref Range Status   05/15/2020 124 (H) 65 - 99 mg/dL Final     Calcium   Date Value Ref Range Status   05/15/2020 9.5 8.6 - 10.5 mg/dL Final     Magnesium   Date Value Ref Range Status   05/15/2020 2.3 1.6 - 2.6 mg/dL Final     AST (SGOT)   Date Value Ref Range Status   05/14/2020 20 1 - 40 U/L Final     ALT (SGPT)   Date Value Ref Range Status   05/14/2020 48 (H) 1 - 41 U/L Final     Alkaline Phosphatase   Date Value Ref Range Status   05/14/2020 74 39 - 117 U/L Final     INR   Date Value Ref Range Status   05/14/2020 1.50 (H) 0.90 - 1.10 Final     Patient Active Problem List   Diagnosis Code   • Bilateral pulmonary  embolism (CMS/HCC) I26.99       Assessment:    Bilateral pulmonary embolism (CMS/HCC)  chest pain  hyperglycemia    Plan:  Will continue to monitor  Recheck labs in the am  Continue on heparin today  Likely home over the weekend and resume eliquis if ok with hematology  Hypercoagulable workup is pending  Pain is better but he is still using iv pain meds  Medium risk  Leticia Jones MD  5/15/2020  14:51

## 2020-05-15 NOTE — PLAN OF CARE
Problem: Patient Care Overview  Goal: Plan of Care Review  Flowsheets (Taken 5/15/2020 4270)  Progress: improving  Plan of Care Reviewed With: patient  Outcome Summary: A&O, complains of pain to RUQ and chest pain when coughing, heparin drip infusing, aptt therapeutic at 0330, requesting to go home today, VSS, will continue to monitor.

## 2020-05-15 NOTE — PROGRESS NOTES
[facility] INPATIENT PROGRESS NOTE    Length of Stay:  1 days    CHIEF COMPLAINT/REASON FOR VISIT:  Bilateral pulmonary embolism    SUBJECTIVE: Feels much better today.  Breathing easier.  Pain 5/10 or less.  No bleeding.  Heparin drip continues.    ROS:  Positive for -chest pain, significantly improved  Negative for -fever, bleeding    OBJECTIVE:  Vitals:    05/14/20 2328 05/15/20 0600 05/15/20 0700 05/15/20 1411   BP: 124/74  116/64 115/65   BP Location: Left arm  Left arm Left arm   Patient Position: Lying  Lying Lying   Pulse:   82 76   Resp: 16  16 18   Temp: 98.7 °F (37.1 °C)  98.9 °F (37.2 °C) 98.2 °F (36.8 °C)   TempSrc: Oral  Oral Oral   SpO2:   98% 98%   Weight:  63.5 kg (140 lb)     Height:             PHYSICAL EXAMINATION:  General: Appears much more comfortable today in no acute distress.  Talkative in full sentences.  Otherwise not examined today.    DIAGNOSTIC DATA:  Results Review:     I reviewed the patient's new clinical results.    Results from last 7 days   Lab Units 05/15/20  0319   WBC 10*3/mm3 14.48*   HEMOGLOBIN g/dL 14.9   HEMATOCRIT % 44.0   PLATELETS 10*3/mm3 241     Lab Results   Component Value Date    NEUTROABS 11.75 (H) 05/14/2020     Results from last 7 days   Lab Units 05/15/20  0319   SODIUM mmol/L 135*   POTASSIUM mmol/L 4.5   CHLORIDE mmol/L 93*   CO2 mmol/L 25.7   BUN mg/dL 10   CREATININE mg/dL 0.83   GLUCOSE mg/dL 124*   CALCIUM mg/dL 9.5     Results from last 7 days   Lab Units 05/15/20  0319 05/14/20  2256 05/14/20  0752   INR   --   --  1.50*   APTT seconds 73.9* 49.1* 38.3*     Results from last 7 days   Lab Units 05/15/20  0319   MAGNESIUM mg/dL 2.3           IMAGING:    None reviewed    Assessment/Plan   ASSESSMENT/PLAN:  This is a 19 y.o. male with:     1. Bilateral lower lobe pulmonary emboli with acute LLE DVT in the posterior tibial vein  · Unprovoked.   No prior leg immobilization.  · He presented on 5/13/2020 with sharp right-sided lower chest  pain  · CT angiogram 5/13/2020 with bilateral lower lobe pulmonary emboli with evolving infarction in the right lower lung.  · D-dimer 3.24  · Baseline PTT 38.3 seconds, slightly prolonged.   · Baseline INR 1.50.    · No malignancy on CT imaging chest abdomen pelvis 5/13/2020  · He has been started on a heparin drip  · Factor V Leiden negative  · Prothrombin negative  · Antithrombin normal  · Factor VIII elevated 283%  · Anticardiolipin and beta2 GP1 ab negative  · LA pending  · Functional protein S slightly low at 65%  · Protein C activity normal 99%  · COVID-19 negative  · Venous duplex with acute LLE DVT in the posterior tibial vein.  2. Chest pain as a result of #1  · Morphine was not helping  · Received Dilaudid and Toradol in the ER which he states helped  · Would like to avoid too much NSAID use with anticoagulation  · Started methylprednisolone 60 mg bid  · Increased frequency of morphine and added oxycodone 10 mg q4h prn  · Pain has improved     RECOMMENDATIONS/PLAN:   *Agree with heparin drip for now with transition back to Eliquis for discharge when appropriate.  He is eager for discharge tonight or tomorrow.  *follow up remaining thrombophilia labs though suspect they will be unrevealing  *continue pain meds and steroids, last dose of methylprednisolone tonight.    He has Eliquis for home use.  I will see him back in the office within the next few weeks.  He will need extended anticoagulation, perhaps indefinite.  Needs primary care.    We will sign off.  Follow-up to be arranged.           oTm Serrato MD

## 2020-05-16 ENCOUNTER — READMISSION MANAGEMENT (OUTPATIENT)
Dept: CALL CENTER | Facility: HOSPITAL | Age: 20
End: 2020-05-16

## 2020-05-16 VITALS
WEIGHT: 140 LBS | SYSTOLIC BLOOD PRESSURE: 113 MMHG | RESPIRATION RATE: 18 BRPM | HEART RATE: 61 BPM | BODY MASS INDEX: 20.73 KG/M2 | OXYGEN SATURATION: 94 % | HEIGHT: 69 IN | DIASTOLIC BLOOD PRESSURE: 65 MMHG | TEMPERATURE: 98 F

## 2020-05-16 LAB
APTT PPP: 62.1 SECONDS (ref 22.7–35.4)
PROT C AG PPP IA-ACNC: 96 % (ref 60–150)
PROT S FREE PPP-ACNC: 73 % (ref 57–157)
PROT S PPP-ACNC: 107 % (ref 60–150)

## 2020-05-16 PROCEDURE — 25010000002 MORPHINE PER 10 MG: Performed by: INTERNAL MEDICINE

## 2020-05-16 PROCEDURE — 25010000002 HEPARIN (PORCINE) PER 1000 UNITS: Performed by: PHYSICIAN ASSISTANT

## 2020-05-16 PROCEDURE — 36415 COLL VENOUS BLD VENIPUNCTURE: CPT | Performed by: PHYSICIAN ASSISTANT

## 2020-05-16 PROCEDURE — 85730 THROMBOPLASTIN TIME PARTIAL: CPT | Performed by: PHYSICIAN ASSISTANT

## 2020-05-16 RX ADMIN — HEPARIN SODIUM 24 UNITS/KG/HR: 10000 INJECTION, SOLUTION INTRAVENOUS at 06:19

## 2020-05-16 RX ADMIN — MORPHINE SULFATE 4 MG: 2 INJECTION, SOLUTION INTRAMUSCULAR; INTRAVENOUS at 06:00

## 2020-05-16 NOTE — PLAN OF CARE
Problem: Patient Care Overview  Goal: Plan of Care Review  Outcome: Ongoing (interventions implemented as appropriate)  Flowsheets (Taken 5/16/2020 2489)  Progress: improving  Plan of Care Reviewed With: patient  Note:   Patient therapeutic on heparin gtt. VSS. Pain well controlled w/ PO medication.

## 2020-05-16 NOTE — PROGRESS NOTES
Name: Jourdan Dumont ADMIT: 2020   : 2000  PCP: Provider, No Known    MRN: [id number] LOS: 2 days   AGE/SEX: 19 y.o. male  ROOM: [facility]     Subjective   Subjective no   No complaints this morning    Review of Systems   Denies chest pain soa kramer     Objective   Objective   Vital Signs  Temp:  [98 °F (36.7 °C)-98.5 °F (36.9 °C)] 98 °F (36.7 °C)  Heart Rate:  [61-79] 61  Resp:  [18] 18  BP: (109-120)/(65-68) 113/65  SpO2:  [92 %-98 %] 94 %  on   ;   Device (Oxygen Therapy): room air  Body mass index is 20.67 kg/m².  Physical Exam  nad  cta  rrr  Non tender  Results Review:       I reviewed the patient's new clinical results.  Results from last 7 days   Lab Units 05/15/20  0319 05/14/20  0625 05/13/20  1931   WBC 10*3/mm3 14.48* 13.87* 12.65*   HEMOGLOBIN g/dL 14.9 15.3 15.1   PLATELETS 10*3/mm3 241 212 217     Results from last 7 days   Lab Units 05/15/20  0319 05/14/20  0625 05/13/20  1931   SODIUM mmol/L 135* 137 137   POTASSIUM mmol/L 4.5 3.8 3.7   CHLORIDE mmol/L 93* 98 98   CO2 mmol/L 25.7 26.9 27.4   BUN mg/dL 10 6 6   CREATININE mg/dL 0.83 1.00 0.99   GLUCOSE mg/dL 124* 105* 104*   Estimated Creatinine Clearance: 126.4 mL/min (by C-G formula based on SCr of 0.83 mg/dL).  Results from last 7 days   Lab Units 20   ALBUMIN g/dL 4.60 4.70   BILIRUBIN mg/dL 1.1 0.8   ALK PHOS U/L 74 77   AST (SGOT) U/L 20 26   ALT (SGPT) U/L 48* 59*     Results from last 7 days   Lab Units 05/15/20  0319 05/14/20  0625 05/13/20  1931   CALCIUM mg/dL 9.5 9.6 10.0   ALBUMIN g/dL  --  4.60 4.70   MAGNESIUM mg/dL 2.3  --   --        No results found for: HGBA1C, POCGLU         heparin (porcine) 18 Units/kg/hr Last Rate: 24 Units/kg/hr (20 0619)   Diet Regular       Assessment/Plan     Active Hospital Problems    Diagnosis  POA   • Bilateral pulmonary embolism (CMS/HCC) [I26.99]  Yes      Resolved Hospital Problems   No resolved problems to display.       19 y.o. male admitted with  <principal problem not specified>.      · Eliquis (home med) for DVT prophylaxis.  · Full code.  · Discussed with patient and ED provider.  · Anticipate discharge home with family today      Branden Shane MD  [facility]  05/16/20  13:23

## 2020-05-16 NOTE — OUTREACH NOTE
Prep Survey      Responses   [facility] patient discharged from?  [place]   Is LACE score < 7 ?  Yes   Eligibility  Not Eligible [LACE <7, not CM, not covid related ]   What are the reasons patient is not eligible?  Other   COVID-19 Test Status  Negative   Does the patient have one of the following disease processes/diagnoses(primary or secondary)?  Other   Prep survey completed?  Yes          Mary Alice Cortes RN      Prep Survey      Responses   [facility] patient discharged from?  [place]   Is LACE score < 7 ?  Yes   Eligibility  Not Eligible [LACE <7, not CM, not covid related ]   What are the reasons patient is not eligible?  Other   COVID-19 Test Status  Negative   Does the patient have one of the following disease processes/diagnoses(primary or secondary)?  Other   Prep survey completed?  Yes          Mary Alice Cortes RN

## 2020-05-16 NOTE — DISCHARGE SUMMARY
Patient Name: Jourdan Dumont  : 2000  MRN: [id number]    Date of Admission: 2020  Date of Discharge:  2020  Primary Care Physician: Provider, No Known      Chief Complaint:   Chest Pain      Discharge Diagnoses     Active Hospital Problems    Diagnosis  POA   • Bilateral pulmonary embolism (CMS/HCC) [I26.99]  Yes      Resolved Hospital Problems   No resolved problems to display.        Hospital Course     Mr. Dumont is a 19 y.o. male with no past medical history who presented to [facility] initially complaining of chest pain and abdominal pain.  Please see the admitting history and physical for further details.  He was found to have unprovoked pulmonary emboli as well as an acute left lower extremity DVT and was admitted to the hospital for further evaluation and treatment.  Hematology saw in consultation.  He was treated with supportive care including analgesics and IV steroids. He was started on a heparin drip and transitioned to p.o. Eliquis at discharge.  Factor V Leiden, prothrombin, anti-thrombin, anticardiolipin and beta-2 GP 1 all negative.  Factor VIII elevated 283%, functional protein S low at 65% and protein C activity was normal at 99%. At the time of discharge thrombophilia labs are still pending but per hematology suspect they will be unrevealing. They will see him back in their office within the next few weeks and they will go over results at that time.  The patient is being discharged home today in stable condition.  He should inform his PCP of this hospitalization and schedule future follow-up visits given current COVID-19 pandemic.    Day of Discharge     No new complaints or events overnight.    Denies chest pain, shortness of breath, tachycardia, palpitations, fever, chills, nausea and vomiting.    Physical Exam:  Temp:  [98 °F (36.7 °C)-98.5 °F (36.9 °C)] 98 °F (36.7 °C)  Heart Rate:  [61-79] 61  Resp:  [18] 18  BP: (109-120)/(65-68) 113/65  Body mass index is  20.67 kg/m².  Physical Exam   Constitutional: He is oriented to person, place, and time. He appears well-developed and well-nourished.   HENT:   Head: Normocephalic and atraumatic.   Eyes: Conjunctivae and EOM are normal.   Neck: Neck supple. No JVD present.   Cardiovascular: Normal rate and regular rhythm.   Pulmonary/Chest: Effort normal and breath sounds normal.   Abdominal: Soft. Bowel sounds are normal. He exhibits no distension. There is no tenderness.   Musculoskeletal: Normal range of motion. He exhibits no edema.   Neurological: He is alert and oriented to person, place, and time.   Skin: Skin is warm and dry.   Psychiatric: He has a normal mood and affect. His behavior is normal.   Nursing note and vitals reviewed.      Consultants     Consult Orders (all) (From admission, onward)     Start     Ordered    05/14/20 0852  Inpatient Hematology & Oncology Consult  Once     Specialty:  Hematology and Oncology  Provider:  Gauri Cameron MD    05/14/20 0852              Procedures     Imaging Results (All)     None          Pertinent Labs     Results from last 7 days   Lab Units 05/15/20  0319 05/14/20  0625 05/13/20  1931   WBC 10*3/mm3 14.48* 13.87* 12.65*   HEMOGLOBIN g/dL 14.9 15.3 15.1   PLATELETS 10*3/mm3 241 212 217     Results from last 7 days   Lab Units 05/15/20  0319 05/14/20  0625 05/13/20  1931   SODIUM mmol/L 135* 137 137   POTASSIUM mmol/L 4.5 3.8 3.7   CHLORIDE mmol/L 93* 98 98   CO2 mmol/L 25.7 26.9 27.4   BUN mg/dL 10 6 6   CREATININE mg/dL 0.83 1.00 0.99   GLUCOSE mg/dL 124* 105* 104*   Estimated Creatinine Clearance: 126.4 mL/min (by C-G formula based on SCr of 0.83 mg/dL).  Results from last 7 days   Lab Units 05/14/20 0625 05/13/20  1931   ALBUMIN g/dL 4.60 4.70   BILIRUBIN mg/dL 1.1 0.8   ALK PHOS U/L 74 77   AST (SGOT) U/L 20 26   ALT (SGPT) U/L 48* 59*     Results from last 7 days   Lab Units 05/15/20  0319 05/14/20  0625 05/13/20  1931   CALCIUM mg/dL 9.5 9.6 10.0   ALBUMIN g/dL  --   4.60 4.70   MAGNESIUM mg/dL 2.3  --   --      Results from last 7 days   Lab Units 05/13/20  1931   LIPASE U/L 12*     Results from last 7 days   Lab Units 05/14/20  0625 05/13/20 1931   TROPONIN T ng/mL <0.010 <0.010   PROBNP pg/mL 76.0 31.0   D DIMER QUANT MCGFEU/mL  --  3.24*           Invalid input(s): LDLCALC        Test Results Pending at Discharge      Order Current Status    Lupus Anticoagulant In process          Discharge Details        Discharge Medications      Continue These Medications      Instructions Start Date   Eliquis DVT/PE Starter Pack tablet   5 mg, Oral, Every 12 Hours         Stop These Medications    HYDROcodone-acetaminophen 5-325 MG per tablet  Commonly known as:  NORCO            No Known Allergies      Discharge Disposition:  Home or Self Care    Discharge Diet:  Diet Order   Procedures   • Diet Regular       Discharge Activity:   Activity Instructions     Activity as Tolerated            CODE STATUS:    Code Status and Medical Interventions:   Ordered at: 05/16/20 0846     Code Status:    CPR     Medical Interventions (Level of Support Prior to Arrest):    Full       No future appointments.  Additional Instructions for the Follow-ups that You Need to Schedule     Discharge Follow-up with PCP   As directed       Currently Documented PCP:    Provider, No Known    PCP Phone Number:    [phone]     Follow Up Details:  1-2 weeks         Discharge Follow-up with Specified Provider: Dr. Serrato; 2 Weeks   As directed      To:  Dr. Serrato    Follow Up:  2 Weeks    Follow Up Details:  call office to make an appt         CBC & Differential    Jun 05, 2020 (Approximate)      Manual Differential:  No           Follow-up Information     Provider, No Known .    Why:  1-2 weeks  Contact information:  [place] [address]  [phone]                   Additional Instructions for the Follow-ups that You Need to Schedule     Discharge Follow-up with PCP   As directed        Currently Documented PCP:    Provider, No Known    PCP Phone Number:    [phone]     Follow Up Details:  1-2 weeks         Discharge Follow-up with Specified Provider: Dr. Serrato; 2 Weeks   As directed      To:  Dr. Serrato    Follow Up:  2 Weeks    Follow Up Details:  call office to make an appt         CBC & Differential    Jun 05, 2020 (Approximate)      Manual Differential:  No           Time Spent on Discharge:  Greater than 30 minutes      TANNER Booth  [facility]  05/16/20  11:32 AM

## 2020-05-16 NOTE — NURSING NOTE
Patient states understanding of all AVS materials. Stable at DC. F/U w/ Oncology/hemotology in 2 weeks.

## 2020-05-17 ENCOUNTER — E-VISIT (OUTPATIENT)
Dept: FAMILY MEDICINE CLINIC | Facility: TELEHEALTH | Age: 20
End: 2020-05-17

## 2020-05-17 ENCOUNTER — NURSE TRIAGE (OUTPATIENT)
Dept: CALL CENTER | Facility: HOSPITAL | Age: 20
End: 2020-05-17

## 2020-05-17 NOTE — PROGRESS NOTES
I counseled the patient to follow up with ED.    Patient complaint of coughing up blood and blood clots and is on a blood thinner.

## 2020-05-17 NOTE — TELEPHONE ENCOUNTER
"Mother states son coughing up blood and sweating. She states some was coming out of nose. She states he's stubborn an won't go back to hospital when told ER. She states she can't measure temperature and he hasn't said anything about dyspnea. Again advised ER and she states she is wanting to talk with MD and had number for Dr. Serrato. Advised call back as needed.     Reason for Disposition  • History of prior \"blood clot\" in leg or lungs (i.e., deep vein thrombosis, pulmonary embolism)    Additional Information  • Negative: Severe difficulty breathing (e.g., struggling for each breath, speaks in single words)  • Negative: [1] Chest pain AND [2] difficulty breathing  • Negative: Bluish (or gray) lips or face now  • Negative: Passed out (i.e., lost consciousness, collapsed and was not responding)  • Negative: Shock suspected (e.g., cold/pale/clammy skin, too weak to stand, low BP, rapid pulse)  • Negative: Difficult to awaken or acting confused (e.g., disoriented, slurred speech)  • Negative: Recent chest injury (i.e., past 24 hours)  • Negative: [1] Coughed up blood AND [2] large amount (example: \"a cup of blood\")  • Negative: Sounds like a life-threatening emergency to the triager  • Negative: Difficulty breathing  • Negative: Chest pain  • Negative: Unclear to triager if the patient is coughing up blood or vomiting blood    Answer Assessment - Initial Assessment Questions  1. ONSET: \"When did you start coughing up blood?\"      Has been going on but started back tonight   2. SEVERITY: \"How many times?\" \"How much blood?\" (e.g., flecks, streaks, tablespoons, etc)      ?   3. COUGHING SPASMS: \"Did the blood appear after a coughing spell?\"        ?   4. RESPIRATORY DISTRESS: \"Describe your breathing.\"       States sleeping now  5. FEVER: \"Do you have a fever?\" If so, ask: \"What is your temperature, how was it measured, and when did it start?\"      Unsure   6. SPUTUM: \"Describe the color of your sputum\" (clear, white, " "yellow, green), \"Has there been any change recently?\"      Blood nose and coughing up   7. CARDIAC HISTORY: \"Do you have any history of heart disease?\" (e.g., heart attack, congestive heart failure)         8. LUNG HISTORY: \"Do you have any history of lung disease?\"  (e.g., pulmonary embolus, asthma, emphysema)      PE   9. PE RISK FACTORS: \"Do you have a history of blood clots?\" (or: recent major surgery, recent prolonged travel, bedridden)      PE recent dx   10. OTHER SYMPTOMS: \"Do you have any other symptoms?\" (e.g., nosebleed, chest pain, abdominal pain, vomiting)        nosebleed  11. PREGNANCY: \"Is there any chance you are pregnant?\" \"When was your last menstrual period?\"        n/a  12. TRAVEL: \"Have you traveled out of the country in the last month?\" (e.g., travel history, exposures)        n/a    Protocols used: COUGHING UP BLOOD-ADULT-AH      "

## 2020-05-18 ENCOUNTER — TELEPHONE (OUTPATIENT)
Dept: ONCOLOGY | Facility: CLINIC | Age: 20
End: 2020-05-18

## 2020-05-18 NOTE — PROGRESS NOTES
Case Management Discharge Note      Final Note: Home no additional dc orders noted. lenora hodge/ccp         Destination      No service has been selected for the patient.      Durable Medical Equipment      No service has been selected for the patient.      Dialysis/Infusion      No service has been selected for the patient.      Home Medical Care      No service has been selected for the patient.      Therapy      No service has been selected for the patient.      Community Resources      No service has been selected for the patient.        Transportation Services  Private: Car    Final Discharge Disposition Code: 01 - home or self-care

## 2020-05-18 NOTE — TELEPHONE ENCOUNTER
----- Message from Tom Serrato MD sent at 5/15/2020  6:59 PM EDT -----  Regarding: Hospital follow-up  1 unit hospital follow-up appointment with me in a few weeks.  Diagnosis is DVT/PE.  CBC at follow-up.  This has been ordered.  Thanks, VENKAT

## 2020-05-19 LAB
DRVVT IMM 1:2 NP PPP: 49.8 SEC (ref 0–47)
LA NT DPL PPP: 50.8 SEC (ref 0–55)
LA NT DPL/LA NT HPL PPP-RTO: 1.03 RATIO (ref 0–1.4)
LA NT PLATELET PPP: 43.9 SEC (ref 0–51.9)
LUPUS ANTICOAGULANT REFLEX: ABNORMAL
SCREEN DRVVT: 1 RATIO (ref 0.8–1.2)
SCREEN DRVVT: 66.6 SEC (ref 0–47)
THROMBIN TIME: 13.4 SEC (ref 0–23)

## 2020-06-02 ENCOUNTER — LAB (OUTPATIENT)
Dept: LAB | Facility: HOSPITAL | Age: 20
End: 2020-06-02

## 2020-06-02 ENCOUNTER — TELEPHONE (OUTPATIENT)
Dept: ONCOLOGY | Facility: CLINIC | Age: 20
End: 2020-06-02

## 2020-06-02 ENCOUNTER — OFFICE VISIT (OUTPATIENT)
Dept: ONCOLOGY | Facility: CLINIC | Age: 20
End: 2020-06-02

## 2020-06-02 VITALS
TEMPERATURE: 98.7 F | HEIGHT: 68 IN | OXYGEN SATURATION: 99 % | BODY MASS INDEX: 22.55 KG/M2 | HEART RATE: 85 BPM | SYSTOLIC BLOOD PRESSURE: 110 MMHG | RESPIRATION RATE: 16 BRPM | DIASTOLIC BLOOD PRESSURE: 68 MMHG | WEIGHT: 148.8 LBS

## 2020-06-02 DIAGNOSIS — I26.99 BILATERAL PULMONARY EMBOLISM (HCC): Primary | ICD-10-CM

## 2020-06-02 DIAGNOSIS — I26.99 BILATERAL PULMONARY EMBOLISM (HCC): ICD-10-CM

## 2020-06-02 LAB
BASOPHILS # BLD AUTO: 0.03 10*3/MM3 (ref 0–0.2)
BASOPHILS NFR BLD AUTO: 0.5 % (ref 0–1.5)
DEPRECATED RDW RBC AUTO: 42.5 FL (ref 37–54)
EOSINOPHIL # BLD AUTO: 0.18 10*3/MM3 (ref 0–0.4)
EOSINOPHIL NFR BLD AUTO: 2.8 % (ref 0.3–6.2)
ERYTHROCYTE [DISTWIDTH] IN BLOOD BY AUTOMATED COUNT: 12.8 % (ref 12.3–15.4)
HCT VFR BLD AUTO: 43.8 % (ref 37.5–51)
HGB BLD-MCNC: 14.6 G/DL (ref 13–17.7)
IMM GRANULOCYTES # BLD AUTO: 0.03 10*3/MM3 (ref 0–0.05)
IMM GRANULOCYTES NFR BLD AUTO: 0.5 % (ref 0–0.5)
LYMPHOCYTES # BLD AUTO: 1.89 10*3/MM3 (ref 0.7–3.1)
LYMPHOCYTES NFR BLD AUTO: 29.3 % (ref 19.6–45.3)
MCH RBC QN AUTO: 30 PG (ref 26.6–33)
MCHC RBC AUTO-ENTMCNC: 33.3 G/DL (ref 31.5–35.7)
MCV RBC AUTO: 90.1 FL (ref 79–97)
MONOCYTES # BLD AUTO: 0.5 10*3/MM3 (ref 0.1–0.9)
MONOCYTES NFR BLD AUTO: 7.8 % (ref 5–12)
NEUTROPHILS # BLD AUTO: 3.81 10*3/MM3 (ref 1.7–7)
NEUTROPHILS NFR BLD AUTO: 59.1 % (ref 42.7–76)
NRBC BLD AUTO-RTO: 0 /100 WBC (ref 0–0.2)
PLATELET # BLD AUTO: 215 10*3/MM3 (ref 140–450)
PMV BLD AUTO: 10.2 FL (ref 6–12)
RBC # BLD AUTO: 4.86 10*6/MM3 (ref 4.14–5.8)
WBC NRBC COR # BLD: 6.44 10*3/MM3 (ref 3.4–10.8)

## 2020-06-02 PROCEDURE — 85025 COMPLETE CBC W/AUTO DIFF WBC: CPT

## 2020-06-02 PROCEDURE — 36415 COLL VENOUS BLD VENIPUNCTURE: CPT

## 2020-06-02 PROCEDURE — 99214 OFFICE O/P EST MOD 30 MIN: CPT | Performed by: INTERNAL MEDICINE

## 2020-06-02 RX ORDER — CYCLOBENZAPRINE HCL 10 MG
10 TABLET ORAL
COMMUNITY
Start: 2020-06-01 | End: 2020-06-02

## 2020-06-02 RX ORDER — TRAMADOL HYDROCHLORIDE 50 MG/1
50 TABLET ORAL
COMMUNITY
Start: 2020-05-18 | End: 2020-09-23 | Stop reason: SDDI

## 2020-06-02 RX ORDER — CYCLOBENZAPRINE HCL 10 MG
TABLET ORAL
COMMUNITY
Start: 2020-06-01 | End: 2020-06-02

## 2020-06-02 RX ORDER — DOCUSATE SODIUM 100 MG/1
100 CAPSULE, LIQUID FILLED ORAL DAILY
COMMUNITY
Start: 2020-05-18 | End: 2020-06-02 | Stop reason: SDUPTHER

## 2020-06-02 RX ORDER — METHYLPREDNISOLONE 4 MG/1
TABLET ORAL
Qty: 1 EACH | Refills: 0 | Status: SHIPPED | OUTPATIENT
Start: 2020-06-02 | End: 2020-09-23

## 2020-06-02 RX ORDER — ONDANSETRON HYDROCHLORIDE 8 MG/1
TABLET, FILM COATED ORAL
COMMUNITY
Start: 2020-06-01 | End: 2020-09-23

## 2020-06-02 RX ORDER — METHOCARBAMOL 500 MG/1
500 TABLET, FILM COATED ORAL 4 TIMES DAILY PRN
Qty: 30 TABLET | Refills: 0 | Status: SHIPPED | OUTPATIENT
Start: 2020-06-02 | End: 2021-07-22 | Stop reason: SDDI

## 2020-06-02 RX ORDER — TRAMADOL HYDROCHLORIDE 50 MG/1
TABLET ORAL
COMMUNITY
Start: 2020-05-18 | End: 2020-06-02 | Stop reason: SDUPTHER

## 2020-06-02 RX ORDER — DOCUSATE SODIUM 100 MG/1
CAPSULE, LIQUID FILLED ORAL
COMMUNITY
Start: 2020-05-18 | End: 2020-06-02 | Stop reason: SDDI

## 2020-06-02 NOTE — PROGRESS NOTES
[facility] GROUP OUTPATIENT FOLLOW UP CLINIC VISIT    REASON FOR FOLLOW-UP:    Bilateral lower lobe pulmonary emboli with acute LLE DVT in the posterior tibial vein.  Pulmonary embolism diagnosed 5/13/2020.    HISTORY OF PRESENT ILLNESS:  Jourdan Dumont is a 19 y.o. male who returns today for follow up of the above issue.      When he left the hospital he was doing well with no pain.  However, pain did return a couple of days after discharge.    He notes anterior right lower chest pain with deep inspiration.  Overall, this has been improving.  He has persistent sharp pain posteriorly at the right shoulder.  He has been using muscle relaxers and acetaminophen for this with some tramadol as well which does help.        He continues Eliquis 5 mg twice daily.  He notes a little bit of easy bleeding with mild injuries but nothing significant.  He otherwise tolerates this well.    HEMATOLOGIC HISTORY:  Presented to urgent care 5/13/2020 with sharp right upper quadrant and right lower chest pain for about 1 day.  Some hemoptysis.  He was sent to the emergency department.     Chest x-ray showed no acute process.  A CT angiogram of the chest 5/13/2020 showed bilateral lower lobe emboli larger and more numerous on the right than the left with developing infarction at the right lung base.     No acute process in the abdomen or pelvis.     Baseline PTT 38.3 seconds.  Baseline INR 1.50.  D-dimer elevated at 3.24.  Complete metabolic panel normal.  Troponin and BNP normal.  Mild leukocytosis with a white blood cell count of 12-13,000 with neutrophilia.  Hemoglobin and platelets normal.  EKG with sinus rhythm.     Denies history of venous thromboembolism.  No known family history.  No leg pain or edema.  No immobilization.  No lower extremity injuries.  No prolonged travel.      Bilateral lower extremity venous duplex 5/14/2020 with acute left lower extremity DVT in the posterior tibial vein.       ALLERGIES:  No Known  "Allergies    MEDICATIONS:  The medication list has been reviewed with the patient by the medical assistant, and the list has been updated in the electronic medical record, which I reviewed.  Medication dosages and frequencies were confirmed to be accurate.    REVIEW OF SYSTEMS:  PAIN:  See Vital Signs below.  GENERAL:  No fevers, chills, night sweats, or unintended weight loss.  SKIN:  No rashes or non-healing lesions.  HEME/LYMPH:  No abnormal bleeding.  No palpable lymphadenopathy.  EYES:  No vision changes or diplopia.  ENT:  No sore throat or difficulty swallowing.  RESPIRATORY: Pleuritic chest pain  CARDIOVASCULAR:  No chest pain, palpitations, orthopnea, or dyspnea on exertion.  GASTROINTESTINAL:  No abdominal pain, nausea, vomiting, constipation, diarrhea, melena, or hematochezia.  GENITOURINARY:  No dysuria or hematuria.  MUSCULOSKELETAL: Right shoulder pain  NEUROLOGIC:  No dizziness, loss of consciousness, or seizures.  PSYCHIATRIC:  No depression, anxiety, or mood changes.    Vitals:    06/02/20 1402   BP: 110/68   Pulse: 85   Resp: 16   Temp: 98.7 °F (37.1 °C)   TempSrc: Oral   SpO2: 99%   Weight: 67.5 kg (148 lb 12.8 oz)   Height: 172 cm (67.72\")   PainSc: 5  Comment: DVT   PainLoc: Shoulder  Comment: bilateral,  brealthing heavy sharp pain       PHYSICAL EXAMINATION:  GENERAL:  Well-developed well-nourished male; awake, alert and oriented, in no acute distress.  Wearing a mask  SKIN:  Warm and dry, without rashes, purpura, or petechiae.  HEAD:  Normocephalic, atraumatic.  EARS:  Hearing intact.  NOSE:  Septum midline.  No excoriations or nasal discharge.  CHEST:  Lungs are clear to auscultation bilaterally.  No wheezes, rales, or rhonchi.  HEART:  Regular rate; normal rhythm.  No murmurs, gallops or rubs.  EXTREMITIES:  No clubbing cyanosis or edema.  NEUROLOGICAL:  No focal neurologic deficits.    DIAGNOSTIC DATA:  Results for orders placed or performed in visit on 06/02/20   CBC Auto Differential "   Result Value Ref Range    WBC 6.44 3.40 - 10.80 10*3/mm3    RBC 4.86 4.14 - 5.80 10*6/mm3    Hemoglobin 14.6 13.0 - 17.7 g/dL    Hematocrit 43.8 37.5 - 51.0 %    MCV 90.1 79.0 - 97.0 fL    MCH 30.0 26.6 - 33.0 pg    MCHC 33.3 31.5 - 35.7 g/dL    RDW 12.8 12.3 - 15.4 %    RDW-SD 42.5 37.0 - 54.0 fl    MPV 10.2 6.0 - 12.0 fL    Platelets 215 140 - 450 10*3/mm3    Neutrophil % 59.1 42.7 - 76.0 %    Lymphocyte % 29.3 19.6 - 45.3 %    Monocyte % 7.8 5.0 - 12.0 %    Eosinophil % 2.8 0.3 - 6.2 %    Basophil % 0.5 0.0 - 1.5 %    Immature Grans % 0.5 0.0 - 0.5 %    Neutrophils, Absolute 3.81 1.70 - 7.00 10*3/mm3    Lymphocytes, Absolute 1.89 0.70 - 3.10 10*3/mm3    Monocytes, Absolute 0.50 0.10 - 0.90 10*3/mm3    Eosinophils, Absolute 0.18 0.00 - 0.40 10*3/mm3    Basophils, Absolute 0.03 0.00 - 0.20 10*3/mm3    Immature Grans, Absolute 0.03 0.00 - 0.05 10*3/mm3    nRBC 0.0 0.0 - 0.2 /100 WBC       IMAGING: CT chest abdomen and pelvis 5/13/2020 with bilateral pulmonary emboli.  No obvious malignancy.    ASSESSMENT:  This is a 19 y.o. male with:  1. Bilateral lower lobe pulmonary emboli with acute LLE DVT in the posterior tibial vein  · Unprovoked.   No prior leg immobilization.  · He presented on 5/13/2020 with sharp right-sided lower chest pain  · CT angiogram 5/13/2020 with bilateral lower lobe pulmonary emboli with evolving infarction in the right lower lung.  · D-dimer 3.24  · Baseline PTT 38.3 seconds, slightly prolonged on Eliquis.   · Baseline INR 1.50 though was on Eliquis.    · No malignancy on CT imaging chest abdomen pelvis 5/13/2020  · Factor V Leiden negative  · Prothrombin negative  · Antithrombin normal  · Factor VIII elevated 283%, may simply be elevated due to acute thrombosis.  Needs repeating.  · Anticardiolipin and beta2 GP1 ab negative  · Lupus anticoagulant negative  · Functional protein S slightly low at 65%.  Total and free protein S normal at 107 and 73% respectively.  · Protein C activity normal  99%, normal antigen 96%  · COVID-19 negative  · Venous duplex with acute LLE DVT in the posterior tibial vein.  · Transitioned to Eliquis at discharge from the hospital 5/16/2020  2. Chest pain as a result of #1  · He is using tramadol, acetaminophen, and Flexeril with some improvement.     RECOMMENDATIONS/PLAN:   *Continue Eliquis 5 mg twice daily  *Extended anticoagulation for unprovoked DVT/PE  *Repeat factor VIII activity level at follow up  *He requests a different muscle relaxer.  Prescription for methocarbamol sent to his pharmacy.  *Prescription for a Medrol dose pack electronically sent to his pharmacy as I think this will help his pain as well  He will continue massage and heat for his right shoulder pain as well  Follow-up in 3 months with a CBC and factor VIII activity level.  Repeat left lower extremity venous duplex after that.

## 2020-09-23 ENCOUNTER — LAB (OUTPATIENT)
Dept: LAB | Facility: HOSPITAL | Age: 20
End: 2020-09-23

## 2020-09-23 ENCOUNTER — OFFICE VISIT (OUTPATIENT)
Dept: ONCOLOGY | Facility: CLINIC | Age: 20
End: 2020-09-23

## 2020-09-23 VITALS
HEART RATE: 76 BPM | DIASTOLIC BLOOD PRESSURE: 85 MMHG | WEIGHT: 160.5 LBS | OXYGEN SATURATION: 99 % | SYSTOLIC BLOOD PRESSURE: 122 MMHG | HEIGHT: 68 IN | TEMPERATURE: 98.7 F | RESPIRATION RATE: 16 BRPM | BODY MASS INDEX: 24.32 KG/M2

## 2020-09-23 DIAGNOSIS — I26.99 BILATERAL PULMONARY EMBOLISM (HCC): ICD-10-CM

## 2020-09-23 DIAGNOSIS — I26.99 BILATERAL PULMONARY EMBOLISM (HCC): Primary | ICD-10-CM

## 2020-09-23 LAB
BASOPHILS # BLD AUTO: 0.01 10*3/MM3 (ref 0–0.2)
BASOPHILS NFR BLD AUTO: 0.2 % (ref 0–1.5)
DEPRECATED RDW RBC AUTO: 41.1 FL (ref 37–54)
EOSINOPHIL # BLD AUTO: 0.07 10*3/MM3 (ref 0–0.4)
EOSINOPHIL NFR BLD AUTO: 1.2 % (ref 0.3–6.2)
ERYTHROCYTE [DISTWIDTH] IN BLOOD BY AUTOMATED COUNT: 12.8 % (ref 12.3–15.4)
HCT VFR BLD AUTO: 47.4 % (ref 37.5–51)
HGB BLD-MCNC: 16.3 G/DL (ref 13–17.7)
IMM GRANULOCYTES # BLD AUTO: 0.01 10*3/MM3 (ref 0–0.05)
IMM GRANULOCYTES NFR BLD AUTO: 0.2 % (ref 0–0.5)
LYMPHOCYTES # BLD AUTO: 2.62 10*3/MM3 (ref 0.7–3.1)
LYMPHOCYTES NFR BLD AUTO: 45.1 % (ref 19.6–45.3)
MCH RBC QN AUTO: 30.4 PG (ref 26.6–33)
MCHC RBC AUTO-ENTMCNC: 34.4 G/DL (ref 31.5–35.7)
MCV RBC AUTO: 88.4 FL (ref 79–97)
MONOCYTES # BLD AUTO: 0.48 10*3/MM3 (ref 0.1–0.9)
MONOCYTES NFR BLD AUTO: 8.3 % (ref 5–12)
NEUTROPHILS NFR BLD AUTO: 2.62 10*3/MM3 (ref 1.7–7)
NEUTROPHILS NFR BLD AUTO: 45 % (ref 42.7–76)
NRBC BLD AUTO-RTO: 0 /100 WBC (ref 0–0.2)
PLATELET # BLD AUTO: 207 10*3/MM3 (ref 140–450)
PMV BLD AUTO: 8.8 FL (ref 6–12)
RBC # BLD AUTO: 5.36 10*6/MM3 (ref 4.14–5.8)
WBC # BLD AUTO: 5.81 10*3/MM3 (ref 3.4–10.8)

## 2020-09-23 PROCEDURE — 85025 COMPLETE CBC W/AUTO DIFF WBC: CPT

## 2020-09-23 PROCEDURE — 99213 OFFICE O/P EST LOW 20 MIN: CPT | Performed by: INTERNAL MEDICINE

## 2020-09-23 PROCEDURE — 85240 CLOT FACTOR VIII AHG 1 STAGE: CPT | Performed by: INTERNAL MEDICINE

## 2020-09-23 PROCEDURE — 36415 COLL VENOUS BLD VENIPUNCTURE: CPT

## 2020-09-23 RX ORDER — KETOCONAZOLE 20 MG/ML
SHAMPOO TOPICAL
COMMUNITY
Start: 2020-08-24 | End: 2021-07-22

## 2020-09-23 RX ORDER — FLUCONAZOLE 150 MG/1
TABLET ORAL
COMMUNITY
Start: 2020-08-24 | End: 2020-09-23

## 2020-09-23 NOTE — PROGRESS NOTES
[facility] GROUP OUTPATIENT FOLLOW UP CLINIC VISIT    REASON FOR FOLLOW-UP:    Bilateral lower lobe pulmonary emboli with acute LLE DVT in the posterior tibial vein.  Pulmonary embolism diagnosed 5/13/2020.    HISTORY OF PRESENT ILLNESS:  Jourdan Dumont is a 19 y.o. male who returns today for follow up of the above issue.      He continues Eliquis 5 mg twice daily.  He tolerates this well.  No bleeding.  His chest pain has improved significantly and he only has occasional fleeting right chest discomfort.  He denies any left leg pain or swelling.  No other complaints today.    HEMATOLOGIC HISTORY:  Presented to urgent care 5/13/2020 with sharp right upper quadrant and right lower chest pain for about 1 day.  Some hemoptysis.  He was sent to the emergency department.     Chest x-ray showed no acute process.  A CT angiogram of the chest 5/13/2020 showed bilateral lower lobe emboli larger and more numerous on the right than the left with developing infarction at the right lung base.     No acute process in the abdomen or pelvis.     Baseline PTT 38.3 seconds.  Baseline INR 1.50.  D-dimer elevated at 3.24.  Complete metabolic panel normal.  Troponin and BNP normal.  Mild leukocytosis with a white blood cell count of 12-13,000 with neutrophilia.  Hemoglobin and platelets normal.  EKG with sinus rhythm.     Denies history of venous thromboembolism.  No known family history.  No leg pain or edema.  No immobilization.  No lower extremity injuries.  No prolonged travel.      Bilateral lower extremity venous duplex 5/14/2020 with acute left lower extremity DVT in the posterior tibial vein.       ALLERGIES:  No Known Allergies    MEDICATIONS:  The medication list has been reviewed with the patient by the medical assistant, and the list has been updated in the electronic medical record, which I reviewed.  Medication dosages and frequencies were confirmed to be accurate.    REVIEW OF SYSTEMS:  PAIN:  See Vital Signs  "below.  GENERAL:  No fevers, chills, night sweats, or unintended weight loss.  SKIN:  No rashes or non-healing lesions.  HEME/LYMPH:  No abnormal bleeding.  No palpable lymphadenopathy.  EYES:  No vision changes or diplopia.  ENT:  No sore throat or difficulty swallowing.  RESPIRATORY: Pleuritic chest pain  CARDIOVASCULAR: Occasional right chest pain; no palpitations, orthopnea, or dyspnea on exertion.  GASTROINTESTINAL:  No abdominal pain, nausea, vomiting, constipation, diarrhea, melena, or hematochezia.  GENITOURINARY:  No dysuria or hematuria.  MUSCULOSKELETAL: Right shoulder pain  NEUROLOGIC:  No dizziness, loss of consciousness, or seizures.  PSYCHIATRIC:  No depression, anxiety, or mood changes.  Reviewed 9/23/2020    Vitals:    09/23/20 1512   BP: 122/85   Pulse: 76   Resp: 16   Temp: 98.7 °F (37.1 °C)   TempSrc: Temporal   SpO2: 99%   Weight: 72.8 kg (160 lb 8 oz)   Height: 172 cm (67.72\")   PainSc: 0-No pain  Comment: PE       PHYSICAL EXAMINATION:  GENERAL:  Well-developed well-nourished male; awake, alert and oriented, in no acute distress.  Wearing a mask  SKIN:  Warm and dry, without rashes, purpura, or petechiae.  HEAD:  Normocephalic, atraumatic.  EARS:  Hearing intact.  NOSE:  Septum midline.  No excoriations or nasal discharge.  CHEST:  Lungs are clear to auscultation bilaterally.  No wheezes, rales, or rhonchi.  HEART:  Regular rate; normal rhythm.  No murmurs, gallops or rubs.  EXTREMITIES:  No clubbing cyanosis or edema.  NEUROLOGICAL:  No focal neurologic deficits.  Examined 9/23/2020.  Changes noted.    DIAGNOSTIC DATA:  Results for orders placed or performed in visit on 09/23/20   CBC Auto Differential    Specimen: Blood   Result Value Ref Range    WBC 5.81 3.40 - 10.80 10*3/mm3    RBC 5.36 4.14 - 5.80 10*6/mm3    Hemoglobin 16.3 13.0 - 17.7 g/dL    Hematocrit 47.4 37.5 - 51.0 %    MCV 88.4 79.0 - 97.0 fL    MCH 30.4 26.6 - 33.0 pg    MCHC 34.4 31.5 - 35.7 g/dL    RDW 12.8 12.3 - 15.4 %    " RDW-SD 41.1 37.0 - 54.0 fl    MPV 8.8 6.0 - 12.0 fL    Platelets 207 140 - 450 10*3/mm3    Neutrophil % 45.0 42.7 - 76.0 %    Lymphocyte % 45.1 19.6 - 45.3 %    Monocyte % 8.3 5.0 - 12.0 %    Eosinophil % 1.2 0.3 - 6.2 %    Basophil % 0.2 0.0 - 1.5 %    Immature Grans % 0.2 0.0 - 0.5 %    Neutrophils, Absolute 2.62 1.70 - 7.00 10*3/mm3    Lymphocytes, Absolute 2.62 0.70 - 3.10 10*3/mm3    Monocytes, Absolute 0.48 0.10 - 0.90 10*3/mm3    Eosinophils, Absolute 0.07 0.00 - 0.40 10*3/mm3    Basophils, Absolute 0.01 0.00 - 0.20 10*3/mm3    Immature Grans, Absolute 0.01 0.00 - 0.05 10*3/mm3    nRBC 0.0 0.0 - 0.2 /100 WBC       IMAGING: CT chest abdomen and pelvis 5/13/2020 with bilateral pulmonary emboli.  No obvious malignancy.    ASSESSMENT:  This is a 19 y.o. male with:  1. Bilateral lower lobe pulmonary emboli with acute LLE DVT in the posterior tibial vein  · Unprovoked.   No prior leg immobilization.  · He presented on 5/13/2020 with sharp right-sided lower chest pain  · CT angiogram 5/13/2020 with bilateral lower lobe pulmonary emboli with evolving infarction in the right lower lung.  · D-dimer 3.24  · Baseline PTT 38.3 seconds, slightly prolonged on Eliquis.   · Baseline INR 1.50 though was on Eliquis.    · No malignancy on CT imaging chest abdomen pelvis 5/13/2020  · Factor V Leiden negative  · Prothrombin negative  · Antithrombin normal  · Factor VIII elevated 283%, may simply be elevated due to acute thrombosis.  Needs repeating.  · Anticardiolipin and beta2 GP1 ab negative  · Lupus anticoagulant negative  · Functional protein S slightly low at 65%.  Total and free protein S normal at 107 and 73% respectively.  · Protein C activity normal 99%, normal antigen 96%  · COVID-19 negative  · Venous duplex with acute LLE DVT in the posterior tibial vein.  · Transitioned to Eliquis at discharge from the hospital 5/16/2020  2. Chest pain as a result of #1  · He is using tramadol, acetaminophen, and Flexeril with some  improvement.     RECOMMENDATIONS/PLAN:   *Continue Eliquis 5 mg twice daily  *Extended anticoagulation for unprovoked DVT/PE  *Repeat factor VIII activity level pending  *Follow-up in 3 months with a left lower extremity venous duplex done prior to that.  At that time we will further discuss anticoagulation options moving forward.  Options would include continuing Eliquis 5 mg twice daily for a longer period of time, continuing Eliquis but at a decreased prophylactic dose of 2.5 mg twice daily, or potentially discontinuing anticoagulation altogether.

## 2020-09-25 LAB
FACT VIII ACT/NOR PPP: 208 % ACTIVITY (ref 60–150)
Lab: ABNORMAL

## 2020-12-11 DIAGNOSIS — I26.99 BILATERAL PULMONARY EMBOLISM (HCC): Primary | ICD-10-CM

## 2020-12-16 ENCOUNTER — APPOINTMENT (OUTPATIENT)
Dept: LAB | Facility: HOSPITAL | Age: 20
End: 2020-12-16

## 2021-01-04 ENCOUNTER — HOSPITAL ENCOUNTER (OUTPATIENT)
Dept: CARDIOLOGY | Facility: HOSPITAL | Age: 21
Discharge: HOME | End: 2021-01-04
Admitting: INTERNAL MEDICINE

## 2021-01-04 DIAGNOSIS — I26.99 BILATERAL PULMONARY EMBOLISM (HCC): ICD-10-CM

## 2021-01-04 LAB
BH CV LOWER VASCULAR LEFT COMMON FEMORAL AUGMENT: NORMAL
BH CV LOWER VASCULAR LEFT COMMON FEMORAL COMPETENT: NORMAL
BH CV LOWER VASCULAR LEFT COMMON FEMORAL COMPRESS: NORMAL
BH CV LOWER VASCULAR LEFT COMMON FEMORAL PHASIC: NORMAL
BH CV LOWER VASCULAR LEFT COMMON FEMORAL SPONT: NORMAL
BH CV LOWER VASCULAR LEFT DISTAL FEMORAL COMPRESS: NORMAL
BH CV LOWER VASCULAR LEFT GASTRONEMIUS COMPRESS: NORMAL
BH CV LOWER VASCULAR LEFT GREATER SAPH AK COMPRESS: NORMAL
BH CV LOWER VASCULAR LEFT GREATER SAPH BK COMPRESS: NORMAL
BH CV LOWER VASCULAR LEFT LESSER SAPH COMPRESS: NORMAL
BH CV LOWER VASCULAR LEFT MID FEMORAL AUGMENT: NORMAL
BH CV LOWER VASCULAR LEFT MID FEMORAL COMPETENT: NORMAL
BH CV LOWER VASCULAR LEFT MID FEMORAL COMPRESS: NORMAL
BH CV LOWER VASCULAR LEFT MID FEMORAL PHASIC: NORMAL
BH CV LOWER VASCULAR LEFT MID FEMORAL SPONT: NORMAL
BH CV LOWER VASCULAR LEFT PERONEAL COMPRESS: NORMAL
BH CV LOWER VASCULAR LEFT POPLITEAL AUGMENT: NORMAL
BH CV LOWER VASCULAR LEFT POPLITEAL COMPETENT: NORMAL
BH CV LOWER VASCULAR LEFT POPLITEAL COMPRESS: NORMAL
BH CV LOWER VASCULAR LEFT POPLITEAL PHASIC: NORMAL
BH CV LOWER VASCULAR LEFT POPLITEAL SPONT: NORMAL
BH CV LOWER VASCULAR LEFT POSTERIOR TIBIAL COMPRESS: NORMAL
BH CV LOWER VASCULAR LEFT PROFUNDA FEMORAL COMPRESS: NORMAL
BH CV LOWER VASCULAR LEFT PROXIMAL FEMORAL COMPRESS: NORMAL
BH CV LOWER VASCULAR LEFT SAPHENOFEMORAL JUNCTION COMPRESS: NORMAL
BH CV LOWER VASCULAR RIGHT COMMON FEMORAL AUGMENT: NORMAL
BH CV LOWER VASCULAR RIGHT COMMON FEMORAL COMPETENT: NORMAL
BH CV LOWER VASCULAR RIGHT COMMON FEMORAL COMPRESS: NORMAL
BH CV LOWER VASCULAR RIGHT COMMON FEMORAL PHASIC: NORMAL
BH CV LOWER VASCULAR RIGHT COMMON FEMORAL SPONT: NORMAL

## 2021-01-04 PROCEDURE — 93971 EXTREMITY STUDY: CPT

## 2021-01-05 NOTE — PROGRESS NOTES
[facility] GROUP OUTPATIENT FOLLOW UP CLINIC VISIT    REASON FOR FOLLOW-UP:    Bilateral lower lobe pulmonary emboli with acute LLE DVT in the posterior tibial vein.  Pulmonary embolism diagnosed 5/13/2020.    HISTORY OF PRESENT ILLNESS:  Jourdan Dumont is a 20 y.o. male who returns today for follow up of the above issue.      He has continued Eliquis 5 mg twice daily.  He tolerates this well without any bleeding.  Occasional chest discomfort.  No significant shortness of breath or persistent chest pain.  No lower extremity edema or pain.    HEMATOLOGIC HISTORY:  Presented to urgent care 5/13/2020 with sharp right upper quadrant and right lower chest pain for about 1 day.  Some hemoptysis.  He was sent to the emergency department.     Chest x-ray showed no acute process.  A CT angiogram of the chest 5/13/2020 showed bilateral lower lobe emboli larger and more numerous on the right than the left with developing infarction at the right lung base.     No acute process in the abdomen or pelvis.     Baseline PTT 38.3 seconds.  Baseline INR 1.50.  D-dimer elevated at 3.24.  Complete metabolic panel normal.  Troponin and BNP normal.  Mild leukocytosis with a white blood cell count of 12-13,000 with neutrophilia.  Hemoglobin and platelets normal.  EKG with sinus rhythm.     Denies history of venous thromboembolism.  No known family history.  No leg pain or edema.  No immobilization.  No lower extremity injuries.  No prolonged travel.      Bilateral lower extremity venous duplex 5/14/2020 with acute left lower extremity DVT in the posterior tibial vein.       ALLERGIES:  No Known Allergies    MEDICATIONS:  The medication list has been reviewed with the patient by the medical assistant, and the list has been updated in the electronic medical record, which I reviewed.  Medication dosages and frequencies were confirmed to be accurate.    REVIEW OF SYSTEMS:  Occasional chest discomfort.  No bleeding.  No shortness of  "breath or persistent chest pain.    Vitals:    01/06/21 0759   BP: 117/74   Pulse: 71   Resp: 16   Temp: 98.2 °F (36.8 °C)   TempSrc: Oral   SpO2: 98%   Weight: 71.8 kg (158 lb 6.4 oz)   Height: 172 cm (67.72\")   PainSc: 0-No pain  Comment: PE       PHYSICAL EXAMINATION:  No acute distress.  Alert and oriented.  Lungs are clear to auscultation bilaterally.  Heart is regular without murmurs gallops or rubs.  Extremities show no clubbing cyanosis or edema.    DIAGNOSTIC DATA:  CBC & Differential (01/06/2021 07:51)  Factor 8 Activity (09/23/2020 15:15)      IMAGING: CT chest abdomen and pelvis 5/13/2020 with bilateral pulmonary emboli.  No obvious malignancy.    ASSESSMENT:  This is a 20 y.o. male with:    *Bilateral lower lobe pulmonary emboli with acute LLE DVT in the posterior tibial vein diagnosed 5/13/2020  · Unprovoked.   No prior leg immobilization.  · He presented on 5/13/2020 with sharp right-sided lower chest pain  · CT angiogram 5/13/2020 with bilateral lower lobe pulmonary emboli with evolving infarction in the right lower lung.  · D-dimer 3.24  · Baseline PTT 38.3 seconds, slightly prolonged on Eliquis.   · Baseline INR 1.50 though was on Eliquis.    · No malignancy on CT imaging chest abdomen pelvis 5/13/2020  · Factor V Leiden negative  · Prothrombin negative  · Antithrombin normal  · Factor VIII elevated 283%, may simply be elevated due to acute thrombosis.  · Anticardiolipin and beta2 GP1 ab negative  · Lupus anticoagulant negative  · Functional protein S slightly low at 65%.  Total and free protein S normal at 107 and 73% respectively.  · Protein C activity normal 99%, normal antigen 96%  · COVID-19 negative  · Venous duplex with acute LLE DVT in the posterior tibial vein.  · Transitioned to Eliquis at discharge from the hospital 5/16/2020  · Repeat factor VIII activity level on 9/23/2020 was lower at 208% compared to 283% previously  · Repeat venous duplex 1/4/2021 normal    *Elevated factor VIII " activity level  · The last factor VIII level was still elevated at 208% as of 9/23/2020.    RECOMMENDATIONS/PLAN:   1. Extended anticoagulation for unprovoked DVT/PE  2. He has been anticoagulated for nearly 8 months.  Options for further anticoagulation at this point include continuing Eliquis 5 mg twice daily for a longer period of time, continuing Eliquis but at a decreased prophylactic dose of 2.5 mg twice daily, or potentially discontinuing anticoagulation altogether, although there is risk with this latter approach since his venous thromboembolism was unprovoked.    3. He and I are in agreement that we will decrease the Eliquis to 2.5 mg twice daily at this point for extended use.  Prescription electronically sent to his pharmacy.  4. Follow-up in 6 months with a CBC, CMP, and factor VIII activity level.

## 2021-01-06 ENCOUNTER — LAB (OUTPATIENT)
Dept: LAB | Facility: HOSPITAL | Age: 21
End: 2021-01-06

## 2021-01-06 ENCOUNTER — OFFICE VISIT (OUTPATIENT)
Dept: ONCOLOGY | Facility: CLINIC | Age: 21
End: 2021-01-06

## 2021-01-06 VITALS
WEIGHT: 158.4 LBS | HEIGHT: 68 IN | DIASTOLIC BLOOD PRESSURE: 74 MMHG | HEART RATE: 71 BPM | OXYGEN SATURATION: 98 % | TEMPERATURE: 98.2 F | SYSTOLIC BLOOD PRESSURE: 117 MMHG | BODY MASS INDEX: 24.01 KG/M2 | RESPIRATION RATE: 16 BRPM

## 2021-01-06 DIAGNOSIS — R79.1 ELEVATED FACTOR VIII LEVEL: ICD-10-CM

## 2021-01-06 DIAGNOSIS — I26.99 BILATERAL PULMONARY EMBOLISM (HCC): ICD-10-CM

## 2021-01-06 DIAGNOSIS — Z86.711 HISTORY OF PULMONARY EMBOLISM: Primary | ICD-10-CM

## 2021-01-06 LAB
BASOPHILS # BLD AUTO: 0.02 10*3/MM3 (ref 0–0.2)
BASOPHILS NFR BLD AUTO: 0.3 % (ref 0–1.5)
DEPRECATED RDW RBC AUTO: 39.7 FL (ref 37–54)
EOSINOPHIL # BLD AUTO: 0.11 10*3/MM3 (ref 0–0.4)
EOSINOPHIL NFR BLD AUTO: 1.8 % (ref 0.3–6.2)
ERYTHROCYTE [DISTWIDTH] IN BLOOD BY AUTOMATED COUNT: 12.6 % (ref 12.3–15.4)
HCT VFR BLD AUTO: 46.2 % (ref 37.5–51)
HGB BLD-MCNC: 16.3 G/DL (ref 13–17.7)
IMM GRANULOCYTES # BLD AUTO: 0.03 10*3/MM3 (ref 0–0.05)
IMM GRANULOCYTES NFR BLD AUTO: 0.5 % (ref 0–0.5)
LYMPHOCYTES # BLD AUTO: 2.63 10*3/MM3 (ref 0.7–3.1)
LYMPHOCYTES NFR BLD AUTO: 43.4 % (ref 19.6–45.3)
MCH RBC QN AUTO: 30.6 PG (ref 26.6–33)
MCHC RBC AUTO-ENTMCNC: 35.3 G/DL (ref 31.5–35.7)
MCV RBC AUTO: 86.7 FL (ref 79–97)
MONOCYTES # BLD AUTO: 0.55 10*3/MM3 (ref 0.1–0.9)
MONOCYTES NFR BLD AUTO: 9.1 % (ref 5–12)
NEUTROPHILS NFR BLD AUTO: 2.72 10*3/MM3 (ref 1.7–7)
NEUTROPHILS NFR BLD AUTO: 44.9 % (ref 42.7–76)
NRBC BLD AUTO-RTO: 0 /100 WBC (ref 0–0.2)
PLATELET # BLD AUTO: 239 10*3/MM3 (ref 140–450)
PMV BLD AUTO: 9.6 FL (ref 6–12)
RBC # BLD AUTO: 5.33 10*6/MM3 (ref 4.14–5.8)
WBC # BLD AUTO: 6.06 10*3/MM3 (ref 3.4–10.8)

## 2021-01-06 PROCEDURE — 36415 COLL VENOUS BLD VENIPUNCTURE: CPT

## 2021-01-06 PROCEDURE — 99214 OFFICE O/P EST MOD 30 MIN: CPT | Performed by: INTERNAL MEDICINE

## 2021-01-06 PROCEDURE — 85025 COMPLETE CBC W/AUTO DIFF WBC: CPT

## 2021-01-07 ENCOUNTER — APPOINTMENT (OUTPATIENT)
Dept: LAB | Facility: HOSPITAL | Age: 21
End: 2021-01-07

## 2021-04-16 ENCOUNTER — BULK ORDERING (OUTPATIENT)
Dept: CASE MANAGEMENT | Facility: OTHER | Age: 21
End: 2021-04-16

## 2021-04-16 DIAGNOSIS — Z23 IMMUNIZATION DUE: ICD-10-CM

## 2021-04-19 ENCOUNTER — TELEPHONE (OUTPATIENT)
Dept: ONCOLOGY | Facility: CLINIC | Age: 21
End: 2021-04-19

## 2021-04-19 NOTE — TELEPHONE ENCOUNTER
Called pt and s/w his wife. She said pt has been having back pain for a few weeks and last night started with sharp chest pain. Pt is on Eliquis 2.5mg BID. Pt has a hx of PEs. Informed pt's wife pt would need to go to the ER for evaluation but that I would let Dr. Serrato know and call her back with any additional information. She v/u. Per Dr. Serrato, he agrees pt needs to go to the ER. Called pt's wife back, no answer, LVM. Also called pt's home phone, no answer, LVM.     ----- Message from Nely Berman RN sent at 4/19/2021  8:36 AM EDT -----  Regarding: FW: Non-Urgent Medical Question  Contact: [phone]    ----- Message -----  From: Jourdan Dumont  Sent: 4/18/2021  11:17 PM EDT  To: Martha [facility] Clinical [place]  Subject: Non-Urgent Medical Question                      Just checking, I'm sure it will be a must for me to make an appointment but I wanted to ask just in case, I am starting to feel the pains in my back and chest from when I had blood clots the first time and just a bit worried that it's such a familiar pain, I have been taking my blood thinners but is this something I should be worried about

## 2021-04-19 NOTE — TELEPHONE ENCOUNTER
Called pt to f/u to see if he went to the ER. Pt said he was feeling much better, he has no back or chest pain anymore. Advised him that chest pain and SOA are signs of PE and need to be evaluated immediately. He v/u, no further questions at this time.

## 2021-06-16 ENCOUNTER — TELEPHONE (OUTPATIENT)
Dept: ONCOLOGY | Facility: CLINIC | Age: 21
End: 2021-06-16

## 2021-06-16 NOTE — TELEPHONE ENCOUNTER
Caller: KIRILL     Relationship: FAYE  (NOT ON VERBAL.  VERBAL WAS ADVISED)     Best call back number  [phone]    PATIENT IS OFF ON THURSDAYS. HE IS NEEDING TO R/S HIS 7-14 LAB AND FOLLOW UP WITH DR CHATTERJEE TO [facility] 7-15.  PLEASE CALL AND ADVISE AND YES TO LVM.   THANK YOU

## 2021-07-21 NOTE — PROGRESS NOTES
"[facility] GROUP OUTPATIENT FOLLOW UP CLINIC VISIT    REASON FOR FOLLOW-UP:    Bilateral lower lobe pulmonary emboli with acute LLE DVT in the posterior tibial vein.  Pulmonary embolism diagnosed 5/13/2020.    HISTORY OF PRESENT ILLNESS:  Jourdan Dumont is a 20 y.o. male who returns today for follow up of the above issue.      He overall feels well.  He has noticed a tiny nodule on the left posterior neck.  This is mildly tender.  He has not had any upper respiratory symptoms or other infectious symptoms.    He is working doing appliance delivery.  He has occasional mild injuries that resulted in some bruising but no significant bleeding.    He has continued Eliquis 2.5 mg twice daily but at this point he ran out of that and is now taking 5 mg but just once daily.      REVIEW OF SYSTEMS:  Easy bruising.  Neck nodule as above.    Vitals:    07/22/21 1022   BP: 126/77   Pulse: 86   Resp: 16   Temp: 97.6 °F (36.4 °C)   TempSrc: Temporal   SpO2: 100%   Weight: 64.7 kg (142 lb 9.6 oz)   Height: 172 cm (67.72\")   PainSc: 0-No pain  Comment: PE       PHYSICAL EXAMINATION:  General:  No acute distress, awake, alert and oriented  Skin:  Warm and dry, no visible rash.  Tattoos visible  HEENT:  Normocephalic/atraumatic.  Wearing a facemask.  Lymphatics: There is a tiny less than 5 mm left posterior cervical lymph node that is palpable.  No other adenopathy is palpable.  Chest:  Normal respiratory effort  Heart: Regular rate and rhythm  Extremities:  No visible clubbing, cyanosis, or edema  Neuro/psych:  Grossly non-focal.  Normal mood and affect.      DIAGNOSTIC DATA:  CBC & Differential (07/22/2021 10:18)      IMAGING: None reviewed    ASSESSMENT:  This is a 20 y.o. male with:    *Bilateral lower lobe pulmonary emboli with acute LLE DVT in the posterior tibial vein diagnosed 5/13/2020  · Unprovoked.   No prior leg immobilization.  · He presented on 5/13/2020 with sharp right-sided lower chest pain  · CT angiogram " 5/13/2020 with bilateral lower lobe pulmonary emboli with evolving infarction in the right lower lung.  · D-dimer 3.24  · Baseline PTT 38.3 seconds, slightly prolonged on Eliquis.   · Baseline INR 1.50 though was on Eliquis.    · No malignancy on CT imaging chest abdomen pelvis 5/13/2020  · Factor V Leiden negative  · Prothrombin negative  · Antithrombin normal  · Factor VIII elevated 283%, may simply be elevated due to acute thrombosis.  · Anticardiolipin and beta2 GP1 ab negative  · Lupus anticoagulant negative  · Functional protein S slightly low at 65%.  Total and free protein S normal at 107 and 73% respectively.  · Protein C activity normal 99%, normal antigen 96%  · COVID-19 negative  · Venous duplex with acute LLE DVT in the posterior tibial vein.  · Transitioned to Eliquis at discharge from the hospital 5/16/2020  · Repeat factor VIII activity level on 9/23/2020 was lower at 208% compared to 283% previously  · Repeat venous duplex 1/4/2021 normal  · In January 2021 we decreased the Eliquis dose to 2.5 mg twice daily for extended prophylaxis  · He is now on Eliquis 2.5 mg twice daily    *Elevated factor VIII activity level  · The last factor VIII level was still elevated at 208% as of 9/23/2020.  · Pending today    *Small left posterior cervical lymph node  · Monitor at this time and I advised him to notify us if it increases in size to 1 cm or more    *Easy bruising on Eliquis  · This is expected.  I advised him that if he has any more significant occupational injuries that it would be reasonable for him to hold a couple of doses of Eliquis if necessary.  I advised him to go to the emergency department for any more severe injuries that may result in bleeding.    RECOMMENDATIONS/PLAN:   1. Extended prophylactic anticoagulation for unprovoked DVT/PE.  He continues Eliquis 2.5 mg twice daily.  2. Monitor at the left posterior cervical lymph node  3. Follow-up the factor VIII activity level from  today  4. Assuming everything looks okay I will see him back in 6 months with a CBC

## 2021-07-22 ENCOUNTER — OFFICE VISIT (OUTPATIENT)
Dept: ONCOLOGY | Facility: CLINIC | Age: 21
End: 2021-07-22

## 2021-07-22 ENCOUNTER — LAB (OUTPATIENT)
Dept: LAB | Facility: HOSPITAL | Age: 21
End: 2021-07-22

## 2021-07-22 VITALS
HEART RATE: 86 BPM | TEMPERATURE: 97.6 F | BODY MASS INDEX: 21.61 KG/M2 | SYSTOLIC BLOOD PRESSURE: 126 MMHG | HEIGHT: 68 IN | WEIGHT: 142.6 LBS | OXYGEN SATURATION: 100 % | RESPIRATION RATE: 16 BRPM | DIASTOLIC BLOOD PRESSURE: 77 MMHG

## 2021-07-22 DIAGNOSIS — Z86.711 HISTORY OF PULMONARY EMBOLISM: ICD-10-CM

## 2021-07-22 DIAGNOSIS — Z86.711 HISTORY OF PULMONARY EMBOLISM: Primary | ICD-10-CM

## 2021-07-22 DIAGNOSIS — R79.1 ELEVATED FACTOR VIII LEVEL: ICD-10-CM

## 2021-07-22 DIAGNOSIS — R59.0 POSTERIOR CERVICAL LYMPHADENOPATHY: ICD-10-CM

## 2021-07-22 LAB
ALBUMIN SERPL-MCNC: 4.8 G/DL (ref 3.5–5.2)
ALBUMIN/GLOB SERPL: 1.7 G/DL (ref 1.1–2.4)
ALP SERPL-CCNC: 78 U/L (ref 38–116)
ALT SERPL W P-5'-P-CCNC: 15 U/L (ref 0–41)
ANION GAP SERPL CALCULATED.3IONS-SCNC: 11.6 MMOL/L (ref 5–15)
AST SERPL-CCNC: 23 U/L (ref 0–40)
BASOPHILS # BLD AUTO: 0.02 10*3/MM3 (ref 0–0.2)
BASOPHILS NFR BLD AUTO: 0.4 % (ref 0–1.5)
BILIRUB SERPL-MCNC: 0.4 MG/DL (ref 0.2–1.2)
BUN SERPL-MCNC: 12 MG/DL (ref 6–20)
BUN/CREAT SERPL: 11.2 (ref 7.3–30)
CALCIUM SPEC-SCNC: 9.8 MG/DL (ref 8.5–10.2)
CHLORIDE SERPL-SCNC: 102 MMOL/L (ref 98–107)
CO2 SERPL-SCNC: 27.4 MMOL/L (ref 22–29)
CREAT SERPL-MCNC: 1.07 MG/DL (ref 0.7–1.3)
DEPRECATED RDW RBC AUTO: 44.1 FL (ref 37–54)
EOSINOPHIL # BLD AUTO: 0.11 10*3/MM3 (ref 0–0.4)
EOSINOPHIL NFR BLD AUTO: 2 % (ref 0.3–6.2)
ERYTHROCYTE [DISTWIDTH] IN BLOOD BY AUTOMATED COUNT: 13.2 % (ref 12.3–15.4)
GFR SERPL CREATININE-BSD FRML MDRD: 86 ML/MIN/1.73
GLOBULIN UR ELPH-MCNC: 2.9 GM/DL (ref 1.8–3.5)
GLUCOSE SERPL-MCNC: 91 MG/DL (ref 74–124)
HCT VFR BLD AUTO: 49.2 % (ref 37.5–51)
HGB BLD-MCNC: 16 G/DL (ref 13–17.7)
IMM GRANULOCYTES # BLD AUTO: 0.01 10*3/MM3 (ref 0–0.05)
IMM GRANULOCYTES NFR BLD AUTO: 0.2 % (ref 0–0.5)
LYMPHOCYTES # BLD AUTO: 2.42 10*3/MM3 (ref 0.7–3.1)
LYMPHOCYTES NFR BLD AUTO: 43.1 % (ref 19.6–45.3)
MCH RBC QN AUTO: 29.3 PG (ref 26.6–33)
MCHC RBC AUTO-ENTMCNC: 32.5 G/DL (ref 31.5–35.7)
MCV RBC AUTO: 89.9 FL (ref 79–97)
MONOCYTES # BLD AUTO: 0.59 10*3/MM3 (ref 0.1–0.9)
MONOCYTES NFR BLD AUTO: 10.5 % (ref 5–12)
NEUTROPHILS NFR BLD AUTO: 2.47 10*3/MM3 (ref 1.7–7)
NEUTROPHILS NFR BLD AUTO: 43.8 % (ref 42.7–76)
NRBC BLD AUTO-RTO: 0 /100 WBC (ref 0–0.2)
PLATELET # BLD AUTO: 267 10*3/MM3 (ref 140–450)
PMV BLD AUTO: 9 FL (ref 6–12)
POTASSIUM SERPL-SCNC: 3.8 MMOL/L (ref 3.5–4.7)
PROT SERPL-MCNC: 7.7 G/DL (ref 6.3–8)
RBC # BLD AUTO: 5.47 10*6/MM3 (ref 4.14–5.8)
SODIUM SERPL-SCNC: 141 MMOL/L (ref 134–145)
WBC # BLD AUTO: 5.62 10*3/MM3 (ref 3.4–10.8)

## 2021-07-22 PROCEDURE — 85025 COMPLETE CBC W/AUTO DIFF WBC: CPT

## 2021-07-22 PROCEDURE — 99214 OFFICE O/P EST MOD 30 MIN: CPT | Performed by: INTERNAL MEDICINE

## 2021-07-22 PROCEDURE — 36415 COLL VENOUS BLD VENIPUNCTURE: CPT

## 2021-07-22 PROCEDURE — 80053 COMPREHEN METABOLIC PANEL: CPT

## 2021-07-22 PROCEDURE — 85240 CLOT FACTOR VIII AHG 1 STAGE: CPT | Performed by: INTERNAL MEDICINE

## 2021-07-23 LAB
FACT VIII ACT/NOR PPP: 209 % ACTIVITY (ref 60–150)
Lab: ABNORMAL

## 2022-01-14 ENCOUNTER — TELEPHONE (OUTPATIENT)
Dept: ONCOLOGY | Facility: HOSPITAL | Age: 22
End: 2022-01-14

## 2022-01-14 NOTE — TELEPHONE ENCOUNTER
Pt his having pain on the left side of his neck and chest.He denies swelling, redness or warmth to touch.  Pt C/O cough over the last couple of days. D/W Dr. Serrato. Per Dr. Serrato pt needs to go to the ED. Pt is agreeable. Pt V/U.

## 2022-01-18 DIAGNOSIS — R79.1 ELEVATED FACTOR VIII LEVEL: ICD-10-CM

## 2022-01-18 DIAGNOSIS — Z86.711 HISTORY OF PULMONARY EMBOLISM: Primary | ICD-10-CM

## 2022-01-20 ENCOUNTER — OFFICE VISIT (OUTPATIENT)
Dept: ONCOLOGY | Facility: CLINIC | Age: 22
End: 2022-01-20

## 2022-01-20 ENCOUNTER — LAB (OUTPATIENT)
Dept: LAB | Facility: HOSPITAL | Age: 22
End: 2022-01-20

## 2022-01-20 VITALS
BODY MASS INDEX: 22.13 KG/M2 | HEIGHT: 68 IN | SYSTOLIC BLOOD PRESSURE: 107 MMHG | HEART RATE: 70 BPM | OXYGEN SATURATION: 98 % | TEMPERATURE: 97.5 F | RESPIRATION RATE: 16 BRPM | DIASTOLIC BLOOD PRESSURE: 66 MMHG | WEIGHT: 146 LBS

## 2022-01-20 DIAGNOSIS — Z86.711 HISTORY OF PULMONARY EMBOLISM: ICD-10-CM

## 2022-01-20 DIAGNOSIS — Z86.711 HISTORY OF PULMONARY EMBOLISM: Primary | ICD-10-CM

## 2022-01-20 DIAGNOSIS — Z79.01 CHRONIC ANTICOAGULATION: ICD-10-CM

## 2022-01-20 DIAGNOSIS — R79.1 ELEVATED FACTOR VIII LEVEL: ICD-10-CM

## 2022-01-20 LAB
BASOPHILS # BLD AUTO: 0.03 10*3/MM3 (ref 0–0.2)
BASOPHILS NFR BLD AUTO: 0.4 % (ref 0–1.5)
DEPRECATED RDW RBC AUTO: 41.6 FL (ref 37–54)
EOSINOPHIL # BLD AUTO: 0.14 10*3/MM3 (ref 0–0.4)
EOSINOPHIL NFR BLD AUTO: 1.7 % (ref 0.3–6.2)
ERYTHROCYTE [DISTWIDTH] IN BLOOD BY AUTOMATED COUNT: 13.1 % (ref 12.3–15.4)
HCT VFR BLD AUTO: 49.5 % (ref 37.5–51)
HGB BLD-MCNC: 17 G/DL (ref 13–17.7)
IMM GRANULOCYTES # BLD AUTO: 0.03 10*3/MM3 (ref 0–0.05)
IMM GRANULOCYTES NFR BLD AUTO: 0.4 % (ref 0–0.5)
LYMPHOCYTES # BLD AUTO: 2.69 10*3/MM3 (ref 0.7–3.1)
LYMPHOCYTES NFR BLD AUTO: 32.5 % (ref 19.6–45.3)
MCH RBC QN AUTO: 30.1 PG (ref 26.6–33)
MCHC RBC AUTO-ENTMCNC: 34.3 G/DL (ref 31.5–35.7)
MCV RBC AUTO: 87.6 FL (ref 79–97)
MONOCYTES # BLD AUTO: 0.66 10*3/MM3 (ref 0.1–0.9)
MONOCYTES NFR BLD AUTO: 8 % (ref 5–12)
NEUTROPHILS NFR BLD AUTO: 4.72 10*3/MM3 (ref 1.7–7)
NEUTROPHILS NFR BLD AUTO: 57 % (ref 42.7–76)
NRBC BLD AUTO-RTO: 0 /100 WBC (ref 0–0.2)
PLATELET # BLD AUTO: 227 10*3/MM3 (ref 140–450)
PMV BLD AUTO: 9.6 FL (ref 6–12)
RBC # BLD AUTO: 5.65 10*6/MM3 (ref 4.14–5.8)
WBC NRBC COR # BLD: 8.27 10*3/MM3 (ref 3.4–10.8)

## 2022-01-20 PROCEDURE — 99213 OFFICE O/P EST LOW 20 MIN: CPT | Performed by: INTERNAL MEDICINE

## 2022-01-20 PROCEDURE — 85025 COMPLETE CBC W/AUTO DIFF WBC: CPT

## 2022-01-20 PROCEDURE — 36415 COLL VENOUS BLD VENIPUNCTURE: CPT

## 2022-01-20 NOTE — PROGRESS NOTES
"[facility] GROUP OUTPATIENT FOLLOW UP CLINIC VISIT    REASON FOR FOLLOW-UP:    History of bilateral lower lobe pulmonary emboli with acute LLE DVT in the posterior tibial vein.  Pulmonary embolism diagnosed 5/13/2020.    HISTORY OF PRESENT ILLNESS:  Jourdan Dumont is a 21 y.o. male who returns today for follow up of the above issue.      He continues to feel well.  He was having some left-sided neck pain recently.  His girlfriend called us regarding this.  He states that it improved over couple of days and essentially resolved.  He denies any bleeding issues, continuing Eliquis 2.5 mg twice daily..      REVIEW OF SYSTEMS:  As per the HPI    Vitals:    01/20/22 0948   Resp: 16   Temp: 97.5 °F (36.4 °C)   TempSrc: Temporal   Weight: 66.2 kg (146 lb)   Height: 172 cm (67.72\")   PainSc: 0-No pain       PHYSICAL EXAMINATION:  General:  No acute distress, awake, alert and oriented  Skin:  Warm and dry, no visible rash.  Tattoos visible  HEENT:  Normocephalic/atraumatic.  Wearing a facemask.  Lymphatics: There is a tiny pea-sized right sided posterior cervical lymph node that is palpable on examination today.  No other adenopathy is palpable.  Chest:  Normal respiratory effort  Heart: Regular rate and rhythm  Extremities:  No visible clubbing, cyanosis, or edema  Neuro/psych:  Grossly non-focal.  Normal mood and affect.      DIAGNOSTIC DATA:  CBC & Differential (01/20/2022 09:51)    IMAGING: None reviewed    ASSESSMENT:  This is a 21 y.o. male with:    *Bilateral lower lobe pulmonary emboli with acute LLE DVT in the posterior tibial vein diagnosed 5/13/2020  · Unprovoked.   No prior leg immobilization.  · He presented on 5/13/2020 with sharp right-sided lower chest pain  · CT angiogram 5/13/2020 with bilateral lower lobe pulmonary emboli with evolving infarction in the right lower lung.  · D-dimer 3.24  · Baseline PTT 38.3 seconds, slightly prolonged on Eliquis.   · Baseline INR 1.50 though was on Eliquis.    · No " malignancy on CT imaging chest abdomen pelvis 5/13/2020  · Factor V Leiden negative  · Prothrombin negative  · Antithrombin normal  · Factor VIII elevated 283%, may simply be elevated due to acute thrombosis.  · Anticardiolipin and beta2 GP1 ab negative  · Lupus anticoagulant negative  · Functional protein S slightly low at 65%.  Total and free protein S normal at 107 and 73% respectively.  · Protein C activity normal 99%, normal antigen 96%  · COVID-19 negative  · Venous duplex with acute LLE DVT in the posterior tibial vein.  · Transitioned to Eliquis at discharge from the hospital 5/16/2020  · Repeat factor VIII activity level on 9/23/2020 was lower at 208% compared to 283% previously  · Repeat venous duplex 1/4/2021 normal  · In January 2021 we decreased the Eliquis dose to 2.5 mg twice daily for extended prophylaxis  · He is now on Eliquis 2.5 mg twice daily    *Elevated factor VIII activity level  · The last factor VIII level was still elevated at 208% as of 9/23/2020.  · Stable mild elevation at 209% as of 7/22/2021    *Small left posterior cervical lymph node  · Monitor at this time and I advised him to notify us if it increases in size to 1 cm or more    *Easy bruising on Eliquis  · This is expected.      RECOMMENDATIONS/PLAN:   1. Extended prophylactic anticoagulation for unprovoked DVT/PE.  He continues Eliquis 2.5 mg twice daily.  2. At this point I will see him back in 1 year for follow-up.  We certainly remain available sooner than that if needed.

## 2022-07-16 ENCOUNTER — APPOINTMENT (OUTPATIENT)
Dept: GENERAL RADIOLOGY | Facility: HOSPITAL | Age: 22
End: 2022-07-16

## 2022-07-16 ENCOUNTER — HOSPITAL ENCOUNTER (OUTPATIENT)
Facility: HOSPITAL | Age: 22
Setting detail: OBSERVATION
Discharge: HOME | End: 2022-07-17
Attending: EMERGENCY MEDICINE | Admitting: INTERNAL MEDICINE

## 2022-07-16 ENCOUNTER — APPOINTMENT (OUTPATIENT)
Dept: CT IMAGING | Facility: HOSPITAL | Age: 22
End: 2022-07-16

## 2022-07-16 DIAGNOSIS — I26.99 PULMONARY INFARCTION: ICD-10-CM

## 2022-07-16 DIAGNOSIS — R07.81 PLEURITIC CHEST PAIN: ICD-10-CM

## 2022-07-16 DIAGNOSIS — I26.99 ACUTE PULMONARY EMBOLISM WITHOUT ACUTE COR PULMONALE, UNSPECIFIED PULMONARY EMBOLISM TYPE: Primary | ICD-10-CM

## 2022-07-16 LAB
ALBUMIN SERPL-MCNC: 4.2 G/DL (ref 3.5–5.2)
ALBUMIN/GLOB SERPL: 1.1 G/DL
ALP SERPL-CCNC: 78 U/L (ref 39–117)
ALT SERPL W P-5'-P-CCNC: 26 U/L (ref 1–41)
ANION GAP SERPL CALCULATED.3IONS-SCNC: 13.6 MMOL/L (ref 5–15)
AST SERPL-CCNC: 25 U/L (ref 1–40)
BASOPHILS # BLD AUTO: 0.02 10*3/MM3 (ref 0–0.2)
BASOPHILS NFR BLD AUTO: 0.2 % (ref 0–1.5)
BILIRUB SERPL-MCNC: 0.9 MG/DL (ref 0–1.2)
BUN SERPL-MCNC: 6 MG/DL (ref 6–20)
BUN/CREAT SERPL: 8.6 (ref 7–25)
CALCIUM SPEC-SCNC: 9.7 MG/DL (ref 8.6–10.5)
CHLORIDE SERPL-SCNC: 94 MMOL/L (ref 98–107)
CO2 SERPL-SCNC: 27.4 MMOL/L (ref 22–29)
CREAT SERPL-MCNC: 0.7 MG/DL (ref 0.76–1.27)
DEPRECATED RDW RBC AUTO: 41.1 FL (ref 37–54)
EGFRCR SERPLBLD CKD-EPI 2021: 132.8 ML/MIN/1.73
EOSINOPHIL # BLD AUTO: 0.05 10*3/MM3 (ref 0–0.4)
EOSINOPHIL NFR BLD AUTO: 0.6 % (ref 0.3–6.2)
ERYTHROCYTE [DISTWIDTH] IN BLOOD BY AUTOMATED COUNT: 12.7 % (ref 12.3–15.4)
GLOBULIN UR ELPH-MCNC: 4 GM/DL
GLUCOSE SERPL-MCNC: 81 MG/DL (ref 65–99)
HCT VFR BLD AUTO: 44.5 % (ref 37.5–51)
HGB BLD-MCNC: 15.7 G/DL (ref 13–17.7)
HOLD SPECIMEN: NORMAL
HOLD SPECIMEN: NORMAL
IMM GRANULOCYTES # BLD AUTO: 0.04 10*3/MM3 (ref 0–0.05)
IMM GRANULOCYTES NFR BLD AUTO: 0.4 % (ref 0–0.5)
LYMPHOCYTES # BLD AUTO: 1.56 10*3/MM3 (ref 0.7–3.1)
LYMPHOCYTES NFR BLD AUTO: 17.4 % (ref 19.6–45.3)
MCH RBC QN AUTO: 31.9 PG (ref 26.6–33)
MCHC RBC AUTO-ENTMCNC: 35.3 G/DL (ref 31.5–35.7)
MCV RBC AUTO: 90.4 FL (ref 79–97)
MONOCYTES # BLD AUTO: 1 10*3/MM3 (ref 0.1–0.9)
MONOCYTES NFR BLD AUTO: 11.1 % (ref 5–12)
NEUTROPHILS NFR BLD AUTO: 6.32 10*3/MM3 (ref 1.7–7)
NEUTROPHILS NFR BLD AUTO: 70.3 % (ref 42.7–76)
NRBC BLD AUTO-RTO: 0 /100 WBC (ref 0–0.2)
NT-PROBNP SERPL-MCNC: 7.4 PG/ML (ref 0–450)
PLATELET # BLD AUTO: 250 10*3/MM3 (ref 140–450)
PMV BLD AUTO: 9.1 FL (ref 6–12)
POTASSIUM SERPL-SCNC: 4.3 MMOL/L (ref 3.5–5.2)
PROT SERPL-MCNC: 8.2 G/DL (ref 6–8.5)
QT INTERVAL: 372 MS
RBC # BLD AUTO: 4.92 10*6/MM3 (ref 4.14–5.8)
SARS-COV-2 RNA RESP QL NAA+PROBE: NOT DETECTED
SODIUM SERPL-SCNC: 135 MMOL/L (ref 136–145)
TROPONIN T SERPL-MCNC: <0.01 NG/ML (ref 0–0.03)
WBC NRBC COR # BLD: 8.99 10*3/MM3 (ref 3.4–10.8)
WHOLE BLOOD HOLD COAG: NORMAL
WHOLE BLOOD HOLD SPECIMEN: NORMAL

## 2022-07-16 PROCEDURE — 85025 COMPLETE CBC W/AUTO DIFF WBC: CPT | Performed by: EMERGENCY MEDICINE

## 2022-07-16 PROCEDURE — G0378 HOSPITAL OBSERVATION PER HR: HCPCS

## 2022-07-16 PROCEDURE — 25010000002 HYDROMORPHONE PER 4 MG: Performed by: EMERGENCY MEDICINE

## 2022-07-16 PROCEDURE — 96372 THER/PROPH/DIAG INJ SC/IM: CPT

## 2022-07-16 PROCEDURE — 99284 EMERGENCY DEPT VISIT MOD MDM: CPT

## 2022-07-16 PROCEDURE — 93010 ELECTROCARDIOGRAM REPORT: CPT | Performed by: INTERNAL MEDICINE

## 2022-07-16 PROCEDURE — 25010000002 ENOXAPARIN PER 10 MG: Performed by: EMERGENCY MEDICINE

## 2022-07-16 PROCEDURE — 25010000002 ONDANSETRON PER 1 MG: Performed by: EMERGENCY MEDICINE

## 2022-07-16 PROCEDURE — 96376 TX/PRO/DX INJ SAME DRUG ADON: CPT

## 2022-07-16 PROCEDURE — 73130 X-RAY EXAM OF HAND: CPT

## 2022-07-16 PROCEDURE — 25010000002 HYDROMORPHONE PER 4 MG: Performed by: HOSPITALIST

## 2022-07-16 PROCEDURE — 93005 ELECTROCARDIOGRAM TRACING: CPT | Performed by: EMERGENCY MEDICINE

## 2022-07-16 PROCEDURE — 0 IOPAMIDOL PER 1 ML: Performed by: EMERGENCY MEDICINE

## 2022-07-16 PROCEDURE — 80053 COMPREHEN METABOLIC PANEL: CPT | Performed by: EMERGENCY MEDICINE

## 2022-07-16 PROCEDURE — C9803 HOPD COVID-19 SPEC COLLECT: HCPCS

## 2022-07-16 PROCEDURE — 96374 THER/PROPH/DIAG INJ IV PUSH: CPT

## 2022-07-16 PROCEDURE — 25010000002 ENOXAPARIN PER 10 MG: Performed by: HOSPITALIST

## 2022-07-16 PROCEDURE — 83880 ASSAY OF NATRIURETIC PEPTIDE: CPT | Performed by: EMERGENCY MEDICINE

## 2022-07-16 PROCEDURE — 84484 ASSAY OF TROPONIN QUANT: CPT | Performed by: EMERGENCY MEDICINE

## 2022-07-16 PROCEDURE — U0003 INFECTIOUS AGENT DETECTION BY NUCLEIC ACID (DNA OR RNA); SEVERE ACUTE RESPIRATORY SYNDROME CORONAVIRUS 2 (SARS-COV-2) (CORONAVIRUS DISEASE [COVID-19]), AMPLIFIED PROBE TECHNIQUE, MAKING USE OF HIGH THROUGHPUT TECHNOLOGIES AS DESCRIBED BY CMS-2020-01-R: HCPCS | Performed by: EMERGENCY MEDICINE

## 2022-07-16 PROCEDURE — 96375 TX/PRO/DX INJ NEW DRUG ADDON: CPT

## 2022-07-16 PROCEDURE — 71046 X-RAY EXAM CHEST 2 VIEWS: CPT

## 2022-07-16 PROCEDURE — 71275 CT ANGIOGRAPHY CHEST: CPT

## 2022-07-16 RX ORDER — HYDROMORPHONE HYDROCHLORIDE 1 MG/ML
0.5 INJECTION, SOLUTION INTRAMUSCULAR; INTRAVENOUS; SUBCUTANEOUS ONCE
Status: COMPLETED | OUTPATIENT
Start: 2022-07-16 | End: 2022-07-16

## 2022-07-16 RX ORDER — OXYCODONE AND ACETAMINOPHEN 7.5; 325 MG/1; MG/1
2 TABLET ORAL EVERY 4 HOURS PRN
Status: DISCONTINUED | OUTPATIENT
Start: 2022-07-16 | End: 2022-07-17 | Stop reason: HOSPADM

## 2022-07-16 RX ORDER — ONDANSETRON 2 MG/ML
4 INJECTION INTRAMUSCULAR; INTRAVENOUS EVERY 6 HOURS PRN
Status: DISCONTINUED | OUTPATIENT
Start: 2022-07-16 | End: 2022-07-17 | Stop reason: HOSPADM

## 2022-07-16 RX ORDER — NITROGLYCERIN 0.4 MG/1
0.4 TABLET SUBLINGUAL
Status: DISCONTINUED | OUTPATIENT
Start: 2022-07-16 | End: 2022-07-17 | Stop reason: HOSPADM

## 2022-07-16 RX ORDER — ENOXAPARIN SODIUM 100 MG/ML
1 INJECTION SUBCUTANEOUS ONCE
Status: COMPLETED | OUTPATIENT
Start: 2022-07-16 | End: 2022-07-16

## 2022-07-16 RX ORDER — HYDROCODONE BITARTRATE AND ACETAMINOPHEN 7.5; 325 MG/1; MG/1
1 TABLET ORAL EVERY 4 HOURS PRN
Status: DISCONTINUED | OUTPATIENT
Start: 2022-07-16 | End: 2022-07-16

## 2022-07-16 RX ORDER — ACETAMINOPHEN 325 MG/1
650 TABLET ORAL EVERY 4 HOURS PRN
Status: DISCONTINUED | OUTPATIENT
Start: 2022-07-16 | End: 2022-07-17 | Stop reason: HOSPADM

## 2022-07-16 RX ORDER — ONDANSETRON 4 MG/1
4 TABLET, FILM COATED ORAL EVERY 6 HOURS PRN
Status: DISCONTINUED | OUTPATIENT
Start: 2022-07-16 | End: 2022-07-17 | Stop reason: HOSPADM

## 2022-07-16 RX ORDER — HYDROCODONE BITARTRATE AND ACETAMINOPHEN 7.5; 325 MG/1; MG/1
1 TABLET ORAL ONCE
Status: COMPLETED | OUTPATIENT
Start: 2022-07-16 | End: 2022-07-16

## 2022-07-16 RX ORDER — ENOXAPARIN SODIUM 100 MG/ML
1 INJECTION SUBCUTANEOUS EVERY 12 HOURS SCHEDULED
Status: DISCONTINUED | OUTPATIENT
Start: 2022-07-16 | End: 2022-07-17

## 2022-07-16 RX ORDER — ONDANSETRON 2 MG/ML
4 INJECTION INTRAMUSCULAR; INTRAVENOUS ONCE
Status: COMPLETED | OUTPATIENT
Start: 2022-07-16 | End: 2022-07-16

## 2022-07-16 RX ORDER — UREA 10 %
3 LOTION (ML) TOPICAL NIGHTLY PRN
Status: DISCONTINUED | OUTPATIENT
Start: 2022-07-16 | End: 2022-07-17 | Stop reason: HOSPADM

## 2022-07-16 RX ORDER — HYDROMORPHONE HYDROCHLORIDE 1 MG/ML
0.5 INJECTION, SOLUTION INTRAMUSCULAR; INTRAVENOUS; SUBCUTANEOUS EVERY 4 HOURS PRN
Status: COMPLETED | OUTPATIENT
Start: 2022-07-16 | End: 2022-07-16

## 2022-07-16 RX ORDER — SODIUM CHLORIDE 0.9 % (FLUSH) 0.9 %
10 SYRINGE (ML) INJECTION AS NEEDED
Status: DISCONTINUED | OUTPATIENT
Start: 2022-07-16 | End: 2022-07-17 | Stop reason: HOSPADM

## 2022-07-16 RX ORDER — OXYCODONE AND ACETAMINOPHEN 7.5; 325 MG/1; MG/1
1 TABLET ORAL EVERY 4 HOURS PRN
Status: DISCONTINUED | OUTPATIENT
Start: 2022-07-16 | End: 2022-07-17 | Stop reason: HOSPADM

## 2022-07-16 RX ADMIN — HYDROCODONE BITARTRATE AND ACETAMINOPHEN 1 TABLET: 7.5; 325 TABLET ORAL at 15:01

## 2022-07-16 RX ADMIN — HYDROMORPHONE HYDROCHLORIDE 0.5 MG: 1 INJECTION, SOLUTION INTRAMUSCULAR; INTRAVENOUS; SUBCUTANEOUS at 12:15

## 2022-07-16 RX ADMIN — ENOXAPARIN SODIUM 70 MG: 80 INJECTION SUBCUTANEOUS at 22:50

## 2022-07-16 RX ADMIN — HYDROCODONE BITARTRATE AND ACETAMINOPHEN 1 TABLET: 7.5; 325 TABLET ORAL at 18:48

## 2022-07-16 RX ADMIN — HYDROMORPHONE HYDROCHLORIDE 0.5 MG: 1 INJECTION, SOLUTION INTRAMUSCULAR; INTRAVENOUS; SUBCUTANEOUS at 20:17

## 2022-07-16 RX ADMIN — ONDANSETRON 4 MG: 2 INJECTION INTRAMUSCULAR; INTRAVENOUS at 12:15

## 2022-07-16 RX ADMIN — ENOXAPARIN SODIUM 60 MG: 100 INJECTION SUBCUTANEOUS at 13:54

## 2022-07-16 RX ADMIN — OXYCODONE HYDROCHLORIDE AND ACETAMINOPHEN 2 TABLET: 7.5; 325 TABLET ORAL at 22:50

## 2022-07-16 RX ADMIN — IOPAMIDOL 100 ML: 755 INJECTION, SOLUTION INTRAVENOUS at 12:54

## 2022-07-16 NOTE — NURSING NOTE
New admit from ER. Chest/flank pain x 3 days, positive for PE. Patient had PE in 5/2020 with positive DVT. He had been discharged on eliquis but was okayed to stop taking after one year, per patient. When his similar symptoms recurred this time, he restarted his eliquis on his own (5mg). Lovenox given. AOx4, VSS. C/O moderate pain, received Norco 7.5 x1 and dilaudid x 1 in ER.

## 2022-07-16 NOTE — ED PROVIDER NOTES
EMERGENCY DEPARTMENT ENCOUNTER    Room Number:  REID/REID  PCP: Sav Jennings NP  Historian: Patient  History Limited By: Nothing      HPI  Chief Complaint: Chest pain   Context: Jourdan Dumont is a 21 y.o. male who presents to the ED c/o chest pain.  Patient states he has history of pulmonary embolism 3 years ago.  Was never diagnosed with clotting disorder.  Patient took himself off Eliquis.  Patient states he started having pleuritic chest pain and pain with movement a few days ago.  Restarted his Eliquis.  States pain is gotten worse.  Pain much worse with lying flat.  Has had mild shortness of breath.  Has had no fevers or chills.  Has had no focal weakness or numbness.  Patient states pain also worse with deep breathing.      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      FAMILY HISTORY  No family history on file.      SOCIAL HISTORY  Social History     Socioeconomic History   • Marital status: Single   Tobacco Use   • Smoking status: Current Every Day Smoker   • Smokeless tobacco: Never Used   Substance and Sexual Activity   • Alcohol use: Yes   • Drug use: Yes     Types: Marijuana         ALLERGIES  Patient has no known  allergies.        REVIEW OF SYSTEMS  Review of Systems   Constitutional: Negative for activity change, appetite change and fever.   HENT: Negative for congestion and sore throat.    Eyes: Negative.    Respiratory: Negative for cough and shortness of breath.    Cardiovascular: Positive for chest pain. Negative for leg swelling.   Gastrointestinal: Negative for abdominal pain, diarrhea and vomiting.   Endocrine: Negative.    Genitourinary: Negative for decreased urine volume and dysuria.   Musculoskeletal: Negative for neck pain.   Skin: Negative for rash and wound.   Allergic/Immunologic: Negative.    Neurological: Negative for weakness, numbness and headaches.   Hematological: Negative.    Psychiatric/Behavioral: Negative.    All other systems reviewed and are negative.           PHYSICAL EXAM  ED Triage Vitals   Temp Heart Rate Resp BP SpO2   07/16/22 1133 07/16/22 1133 07/16/22 1133 07/16/22 1137 07/16/22 1133   99 °F (37.2 °C) 106 24 134/85 99 %      Temp src Heart Rate Source Patient Position BP Location FiO2 (%)   07/16/22 1133 07/16/22 1133 -- -- --   Tympanic Monitor          Physical Exam  Vitals and nursing note reviewed.   Constitutional:       General: He is not in acute distress.  HENT:      Head: Normocephalic and atraumatic.   Eyes:      Pupils: Pupils are equal, round, and reactive to light.   Cardiovascular:      Rate and Rhythm: Normal rate and regular rhythm.      Heart sounds: Normal heart sounds.   Pulmonary:      Effort: Pulmonary effort is normal. No respiratory distress.      Breath sounds: Normal breath sounds.   Chest:      Chest wall: Tenderness present.   Abdominal:      Palpations: Abdomen is soft.      Tenderness: There is no abdominal tenderness. There is no guarding or rebound.   Musculoskeletal:         General: Normal range of motion.      Cervical back: Normal range of motion and neck supple.   Skin:     General: Skin is warm and dry.   Neurological:      Mental Status: He is alert  and oriented to person, place, and time.      Sensory: Sensation is intact.   Psychiatric:         Mood and Affect: Mood and affect normal.       Patient was wearing a face mask when I entered the room and they continued to wear a mask throughout their stay in the ED.  I wore PPE, including gloves, face mask with shield or face mask with goggles whenever I was in the room with patient.       LAB RESULTS  Recent Results (from the past 24 hour(s))   Comprehensive Metabolic Panel    Collection Time: 07/16/22 11:54 AM    Specimen: Blood   Result Value Ref Range    Glucose 81 65 - 99 mg/dL    BUN 6 6 - 20 mg/dL    Creatinine 0.70 (L) 0.76 - 1.27 mg/dL    Sodium 135 (L) 136 - 145 mmol/L    Potassium 4.3 3.5 - 5.2 mmol/L    Chloride 94 (L) 98 - 107 mmol/L    CO2 27.4 22.0 - 29.0 mmol/L    Calcium 9.7 8.6 - 10.5 mg/dL    Total Protein 8.2 6.0 - 8.5 g/dL    Albumin 4.20 3.50 - 5.20 g/dL    ALT (SGPT) 26 1 - 41 U/L    AST (SGOT) 25 1 - 40 U/L    Alkaline Phosphatase 78 39 - 117 U/L    Total Bilirubin 0.9 0.0 - 1.2 mg/dL    Globulin 4.0 gm/dL    A/G Ratio 1.1 g/dL    BUN/Creatinine Ratio 8.6 7.0 - 25.0    Anion Gap 13.6 5.0 - 15.0 mmol/L    eGFR 132.8 >60.0 mL/min/1.73   BNP    Collection Time: 07/16/22 11:54 AM    Specimen: Blood   Result Value Ref Range    proBNP 7.4 0.0 - 450.0 pg/mL   Troponin    Collection Time: 07/16/22 11:54 AM    Specimen: Blood   Result Value Ref Range    Troponin T <0.010 0.000 - 0.030 ng/mL   Green Top (Gel)    Collection Time: 07/16/22 11:54 AM   Result Value Ref Range    Extra Tube Hold for add-ons.    Lavender Top    Collection Time: 07/16/22 11:54 AM   Result Value Ref Range    Extra Tube hold for add-on    Gold Top - SST    Collection Time: 07/16/22 11:54 AM   Result Value Ref Range    Extra Tube Hold for add-ons.    Light Blue Top    Collection Time: 07/16/22 11:54 AM   Result Value Ref Range    Extra Tube Hold for add-ons.    CBC Auto Differential    Collection Time: 07/16/22 11:54 AM     Specimen: Blood   Result Value Ref Range    WBC 8.99 3.40 - 10.80 10*3/mm3    RBC 4.92 4.14 - 5.80 10*6/mm3    Hemoglobin 15.7 13.0 - 17.7 g/dL    Hematocrit 44.5 37.5 - 51.0 %    MCV 90.4 79.0 - 97.0 fL    MCH 31.9 26.6 - 33.0 pg    MCHC 35.3 31.5 - 35.7 g/dL    RDW 12.7 12.3 - 15.4 %    RDW-SD 41.1 37.0 - 54.0 fl    MPV 9.1 6.0 - 12.0 fL    Platelets 250 140 - 450 10*3/mm3    Neutrophil % 70.3 42.7 - 76.0 %    Lymphocyte % 17.4 (L) 19.6 - 45.3 %    Monocyte % 11.1 5.0 - 12.0 %    Eosinophil % 0.6 0.3 - 6.2 %    Basophil % 0.2 0.0 - 1.5 %    Immature Grans % 0.4 0.0 - 0.5 %    Neutrophils, Absolute 6.32 1.70 - 7.00 10*3/mm3    Lymphocytes, Absolute 1.56 0.70 - 3.10 10*3/mm3    Monocytes, Absolute 1.00 (H) 0.10 - 0.90 10*3/mm3    Eosinophils, Absolute 0.05 0.00 - 0.40 10*3/mm3    Basophils, Absolute 0.02 0.00 - 0.20 10*3/mm3    Immature Grans, Absolute 0.04 0.00 - 0.05 10*3/mm3    nRBC 0.0 0.0 - 0.2 /100 WBC   ECG 12 Lead    Collection Time: 07/16/22 12:06 PM   Result Value Ref Range    QT Interval 372 ms   COVID-19,[facility] CEPHEID/VALENTE NP SWAB IN TRANSPORT MEDIA 8-12 HR TAT - Swab, Nasopharynx    Collection Time: 07/16/22  3:01 PM    Specimen: Nasopharynx; Swab   Result Value Ref Range    COVID19 Not Detected Not Detected - Ref. Range       Ordered the above labs and reviewed the results.        RADIOLOGY  XR Hand 3+ View Right   Final Result   Negative right hand.       This report was finalized on 7/16/2022 1:48 PM by Dr. Demetrio Mcgowan M.D.          XR Chest 2 View   Final Result   Increased opacity superimposes the posterior costophrenic   angles on the lateral view consistent with a mild infiltrate in the   proper clinical setting and this appears to be within the left lower   lobe on the PA view.       This report was finalized on 7/16/2022 12:44 PM by Dr. Demetrio Mcgowan M.D.          CT Angiogram Chest   Final Result   Extensive bilateral pulmonary emboli greatest at both lower   lobes with  the largest emboli within the distal left main pulmonary   artery and extending into lower lobe segmental segments. Suspect   developing infarction within the left lower lobe along the left   posterior costophrenic angle.       Discussed with Dr. Estrada in the emergency department on 07/16/2022   at 1 PM.       This report was finalized on 7/16/2022 1:16 PM by Dr. Demetrio Mcgowan M.D.               Ordered the above noted radiological studies. Reviewed by me in PACS.  Discussed with Dr. Mcgowan (radiologist) regarding CT/MRI scan results.          PROCEDURES  Procedures      EKG:          EKG time: 1206  Rhythm/Rate: Normal sinus rhythm 86  P waves and NE: Normal P waves  QRS, axis: Normal QRS  ST and T waves: Normal ST-T wave    Interpreted Contemporaneously by me, independently viewed  Unchanged compared to prior 5/14/2020        MEDICATIONS GIVEN IN ER  Medications   sodium chloride 0.9 % flush 10 mL (has no administration in time range)   HYDROmorphone (DILAUDID) injection 0.5 mg (0.5 mg Intravenous Given 7/16/22 1215)   ondansetron (ZOFRAN) injection 4 mg (4 mg Intravenous Given 7/16/22 1215)   iopamidol (ISOVUE-370) 76 % injection 100 mL (100 mL Intravenous Given by Other 7/16/22 1254)   Enoxaparin Sodium (LOVENOX) syringe 60 mg (60 mg Subcutaneous Given 7/16/22 1354)   HYDROcodone-acetaminophen (NORCO) 7.5-325 MG per tablet 1 tablet (1 tablet Oral Given 7/16/22 1501)             PROGRESS AND CONSULTS  ED Course as of 07/16/22 1617   Sat Jul 16, 2022   1422 14:22 EDT  Patient was CT scan that shows multiple pulmonary emboli.  Patient has no evidence of RV strain.  Patient restarted his Eliquis a couple days ago after the symptoms.  Does not sound like treatment failure.  Patient has been given Lovenox here.  He has been discussed with Dr. Dixon who will admit. [SL]      ED Course User Index  [SL] Julio Cesar Estrada MD           MEDICAL DECISION MAKING      MDM  Number of Diagnoses or Management Options      Amount and/or Complexity of Data Reviewed  Clinical lab tests: reviewed and ordered (White blood cell count 8.9)  Tests in the radiology section of CPT®: reviewed and ordered (CT with bilateral pulmonary emboli with pulmonary infarction)  Discuss the patient with other providers: yes (Discussed with Dr. Dixon who will admit.)               DIAGNOSIS  Final diagnoses:   Acute pulmonary embolism without acute cor pulmonale, unspecified pulmonary embolism type (HCC)           DISPOSITION  admit        Latest Documented Vital Signs:  As of 16:17 EDT  BP- 127/84 HR- 83 Temp- 99 °F (37.2 °C) (Tympanic) O2 sat- 98%                       Julio Cesar Estrada MD  07/16/22 3526

## 2022-07-16 NOTE — ED NOTES
Nursing report ED to floor  Jourdan Dumont  21 y.o.  male    HPI :   Chief Complaint   Patient presents with   • Shortness of Breath       Admitting doctor:   Diaz Dixon MD    Admitting diagnosis:   The encounter diagnosis was Acute pulmonary embolism without acute cor pulmonale, unspecified pulmonary embolism type (HCC).    Code status:   Current Code Status     Date Active Code Status Order ID Comments User Context       Prior    Advance Care Planning Activity          Allergies:   Patient has no known allergies.    Intake and Output  No intake or output data in the 24 hours ending 07/16/22 1504    Weight:       07/16/22  1327   Weight: 63.4 kg (139 lb 12.8 oz)       Most recent vitals:   Vitals:    07/16/22 1327 07/16/22 1356 07/16/22 1400 07/16/22 1441   BP:  145/69  127/84   Pulse:  89 81 83   Resp:       Temp:       TempSrc:       SpO2:  100% 97% 98%   Weight: 63.4 kg (139 lb 12.8 oz)          Active LDAs/IV Access:   Lines, Drains & Airways     Active LDAs     Name Placement date Placement time Site Days    Peripheral IV 07/16/22 1155 Right Antecubital 07/16/22  1155  Antecubital  less than 1                Labs (abnormal labs have a star):   Labs Reviewed   COMPREHENSIVE METABOLIC PANEL - Abnormal; Notable for the following components:       Result Value    Creatinine 0.70 (*)     Sodium 135 (*)     Chloride 94 (*)     All other components within normal limits    Narrative:     GFR Normal >60  Chronic Kidney Disease <60  Kidney Failure <15     CBC WITH AUTO DIFFERENTIAL - Abnormal; Notable for the following components:    Lymphocyte % 17.4 (*)     Monocytes, Absolute 1.00 (*)     All other components within normal limits   BNP (IN-HOUSE) - Normal    Narrative:     Among patients with dyspnea, NT-proBNP is highly sensitive for the detection of acute congestive heart failure. In addition NT-proBNP of <300 pg/ml effectively rules out acute congestive heart failure with 99% negative predictive value.    Results  may be falsely decreased if patient taking Biotin.     TROPONIN (IN-HOUSE) - Normal    Narrative:     Troponin T Reference Range:  <= 0.03 ng/mL-   Negative for AMI  >0.03 ng/mL-     Abnormal for myocardial necrosis.  Clinicians would have to utilize clinical acumen, EKG, Troponin and serial changes to determine if it is an Acute Myocardial Infarction or myocardial injury due to an underlying chronic condition.       Results may be falsely decreased if patient taking Biotin.     COVID PRE-OP / PRE-PROCEDURE SCREENING ORDER (NO ISOLATION)    Narrative:     The following orders were created for panel order COVID PRE-OP / PRE-PROCEDURE SCREENING ORDER (NO ISOLATION) - Swab, Nasopharynx.  Procedure                               Abnormality         Status                     ---------                               -----------         ------                     COVID-19, [facility]...[[id number]]                                                   Please view results for these tests on the individual orders.   COVID-19, [facility] CEPHEID/VALENTE, NP SWAB IN TRANSPORT MEDIA 8-12 HR TAT   RAINBOW DRAW    Narrative:     The following orders were created for panel order [place] Draw.  Procedure                               Abnormality         Status                     ---------                               -----------         ------                     Green Top (Gel)[[id number]]                                  Final result               Lavender Top[[id number]]                                     Final result               Gold Top - SST[[id number]]                                   Final result               Light Blue Top[[id number]]                                   Final result                 Please view results for these tests on the individual orders.   CBC AND DIFFERENTIAL    Narrative:     The following orders were created for panel order CBC & Differential.  Procedure                               Abnormality          Status                     ---------                               -----------         ------                     CBC Auto Differential[[id number]]        Abnormal            Final result                 Please view results for these tests on the individual orders.   GREEN TOP   LAVENDER TOP   GOLD TOP - SST   LIGHT BLUE TOP       EKG:   ECG 12 Lead   Preliminary Result   HEART RATE= 86  bpm   RR Interval= 698  ms   VT Interval= 131  ms   P Horizontal Axis= -15  deg   P Front Axis= 75  deg   QRSD Interval= 86  ms   QT Interval= 372  ms   QRS Axis= 65  deg   T Wave Axis= 43  deg   - NORMAL ECG -   Sinus rhythm   Electronically Signed By:    Date and Time of Study: 2022-07-16 12:06:11          Meds given in ED:   Medications   sodium chloride 0.9 % flush 10 mL (has no administration in time range)   HYDROmorphone (DILAUDID) injection 0.5 mg (0.5 mg Intravenous Given 7/16/22 1215)   ondansetron (ZOFRAN) injection 4 mg (4 mg Intravenous Given 7/16/22 1215)   iopamidol (ISOVUE-370) 76 % injection 100 mL (100 mL Intravenous Given by Other 7/16/22 1254)   Enoxaparin Sodium (LOVENOX) syringe 60 mg (60 mg Subcutaneous Given 7/16/22 1354)   HYDROcodone-acetaminophen (NORCO) 7.5-325 MG per tablet 1 tablet (1 tablet Oral Given 7/16/22 1501)       Imaging results:  XR Chest 2 View    Result Date: 7/16/2022  Increased opacity superimposes the posterior costophrenic angles on the lateral view consistent with a mild infiltrate in the proper clinical setting and this appears to be within the left lower lobe on the PA view.  This report was finalized on 7/16/2022 12:44 PM by Dr. Demetrio Mcgowan M.D.      XR Hand 3+ View Right    Result Date: 7/16/2022  Negative right hand.  This report was finalized on 7/16/2022 1:48 PM by Dr. Demetrio Mcgowan M.D.      CT Angiogram Chest    Result Date: 7/16/2022  Extensive bilateral pulmonary emboli greatest at both lower lobes with the largest emboli within the distal left main pulmonary  artery and extending into lower lobe segmental segments. Suspect developing infarction within the left lower lobe along the left posterior costophrenic angle.  Discussed with Dr. Estrada in the emergency department on 07/16/2022 at 1 PM.  This report was finalized on 7/16/2022 1:16 PM by Dr. Demetrio Mcgowan M.D.        Ambulatory status:   - ad yocasta     Social issues:   Social History     Socioeconomic History   • Marital status: Single   Tobacco Use   • Smoking status: Current Every Day Smoker   • Smokeless tobacco: Never Used   Substance and Sexual Activity   • Alcohol use: Yes   • Drug use: Yes     Types: Marijuana       NIH Stroke Scale:        Nursing report ED to floor:

## 2022-07-16 NOTE — PROGRESS NOTES
[facility] Clinical Pharmacy Services: Enoxaparin Consult    Jourdan Dumont has a pharmacy consult to dose full-dose enoxaparin per Dr. Dixon's request.     Indication: PE  Home Anticoagulation: Apixaban 2.5mg BID - patient with h/o PE 3 years ago. Took himself off Eliquis but restarted when chest pain started.    Relevant clinical data and objective history reviewed:  21 y.o. male   65.8 kg (145 lb)   Body mass index is 22.23 kg/m².   Results from last 7 days   Lab Units 07/16/22  1154   PLATELETS 10*3/mm3 250     Estimated Creatinine Clearance: 152.8 mL/min (A) (by C-G formula based on SCr of 0.7 mg/dL (L)).    Assessment/Plan    Will start patient on  70 (1mg/kg) subcutaneous every 12 hours, adjusted for renal function. Next dose will be at 2300, previous 60mg dose given at 1354. Consult order will be discontinued but pharmacy will continue to follow.     Capo Coker, [facility]  Clinical Pharmacist

## 2022-07-16 NOTE — H&P
Patient Name:  Jourdan Dumont  YOB: 2000  MRN:  [id number]  Admit Date:  7/16/2022  Patient Care Team:  Sav Jennings NP as PCP - General (Nurse Practitioner)  Tom Serrato MD as Consulting Physician (Hematology and Oncology)  Leticia Jones MD as Referring Physician (Hospitalist)      Subjective   History Present Illness     Chief Complaint   Patient presents with   • Shortness of Breath       Mr. Dumont is a 21 y.o. male with a history of prior DVT and PE that presents to [facility] complaining of shortness of breath and chest pain.  He had DVT and PE about 3 years ago and was treated with Eliquis.  Followed by Dr. Serrato in the CBC office.  Per patient they discussed coming off of the Eliquis which the patient did about 6 months ago.  Over the last week he has had some shortness of breath and increasing pain in the left side of his chest.  He started taking his Eliquis again a few days ago.  Presented to the emergency department because of severe pain.  His pain is primarily on the left side of his chest and worse with deep inspiration.  He has had some scant hemoptysis.  No lightheadedness, presyncope or syncope.  No fever or chills.  No recent travel or periods of prolonged immobility.  No injury to his lower extremities.      History of Present Illness  Review of Systems   Constitutional: Negative.    HENT: Negative.    Eyes: Negative.    Respiratory: Positive for shortness of breath.    Cardiovascular: Positive for chest pain. Negative for palpitations and leg swelling.   Gastrointestinal: Negative.    Endocrine: Negative.    Genitourinary: Negative.    Musculoskeletal: Negative.    Skin: Negative.    Neurological: Negative.    Hematological: Negative.    Psychiatric/Behavioral: Negative.         Personal History     Past Medical History:   Diagnosis Date   • History of blood clots    • Pulmonary embolism (HCC)      No past surgical history on file.  No family  history on file.  Social History     Tobacco Use   • Smoking status: Current Every Day Smoker   • Smokeless tobacco: Never Used   Substance Use Topics   • Alcohol use: Yes   • Drug use: Yes     Types: Marijuana     No current facility-administered medications on file prior to encounter.     Current Outpatient Medications on File Prior to Encounter   Medication Sig Dispense Refill   • apixaban (ELIQUIS) 2.5 MG tablet tablet Take 1 tablet by mouth Every 12 (Twelve) Hours for 360 days. 180 tablet 3     No Known Allergies    Objective    Objective     Vital Signs  Temp:  [98.5 °F (36.9 °C)-99 °F (37.2 °C)] 98.6 °F (37 °C)  Heart Rate:  [] 91  Resp:  [18-24] 18  BP: (119-145)/(69-88) 122/78  SpO2:  [96 %-100 %] 100 %  on   ;   Device (Oxygen Therapy): room air  Body mass index is 22.23 kg/m².    Physical Exam  Vitals reviewed.   Constitutional:       Appearance: Normal appearance. He is well-developed.   HENT:      Head: Normocephalic and atraumatic.   Eyes:      General: No scleral icterus.     Conjunctiva/sclera: Conjunctivae normal.   Neck:      Vascular: No JVD.   Cardiovascular:      Rate and Rhythm: Normal rate and regular rhythm.      Heart sounds: No murmur heard.  Pulmonary:      Effort: Pulmonary effort is normal. No respiratory distress.      Breath sounds: Normal breath sounds.   Abdominal:      General: Bowel sounds are normal. There is no distension.      Palpations: Abdomen is soft.      Tenderness: There is no abdominal tenderness.   Musculoskeletal:      Cervical back: Normal range of motion and neck supple.   Skin:     General: Skin is warm and dry.   Neurological:      Mental Status: He is alert and oriented to person, place, and time.      Cranial Nerves: No cranial nerve deficit.   Psychiatric:         Behavior: Behavior normal.         Results Review:  I reviewed the patient's new clinical results.  I reviewed the patient's new imaging results and agree with the interpretation.  I reviewed the  patient's other test results and agree with the interpretation  I personally viewed and interpreted the patient's EKG/Telemetry data  Discussed with ED provider.    Lab Results (last 24 hours)     Procedure Component Value Units Date/Time    CBC & Differential [[id number]]  (Abnormal) Collected: 07/16/22 1154    Specimen: Blood Updated: 07/16/22 1214    Narrative:      The following orders were created for panel order CBC & Differential.  Procedure                               Abnormality         Status                     ---------                               -----------         ------                     CBC Auto Differential[[id number]]        Abnormal            Final result                 Please view results for these tests on the individual orders.    Comprehensive Metabolic Panel [[id number]]  (Abnormal) Collected: 07/16/22 1154    Specimen: Blood Updated: 07/16/22 1231     Glucose 81 mg/dL      BUN 6 mg/dL      Creatinine 0.70 mg/dL      Sodium 135 mmol/L      Potassium 4.3 mmol/L      Chloride 94 mmol/L      CO2 27.4 mmol/L      Calcium 9.7 mg/dL      Total Protein 8.2 g/dL      Albumin 4.20 g/dL      ALT (SGPT) 26 U/L      AST (SGOT) 25 U/L      Alkaline Phosphatase 78 U/L      Total Bilirubin 0.9 mg/dL      Globulin 4.0 gm/dL      A/G Ratio 1.1 g/dL      BUN/Creatinine Ratio 8.6     Anion Gap 13.6 mmol/L      eGFR 132.8 mL/min/1.73      Comment: National Kidney Foundation and American Society of Nephrology (ASN) Task Force recommended calculation based on the Chronic Kidney Disease Epidemiology Collaboration (CKD-EPI) equation refit without adjustment for race.       Narrative:      GFR Normal >60  Chronic Kidney Disease <60  Kidney Failure <15      BNP [[id number]]  (Normal) Collected: 07/16/22 1154    Specimen: Blood Updated: 07/16/22 1229     proBNP 7.4 pg/mL     Narrative:      Among patients with dyspnea, NT-proBNP is highly sensitive for the detection of acute congestive heart failure. In addition  NT-proBNP of <300 pg/ml effectively rules out acute congestive heart failure with 99% negative predictive value.    Results may be falsely decreased if patient taking Biotin.      Troponin [[id number]]  (Normal) Collected: 07/16/22 1154    Specimen: Blood Updated: 07/16/22 1231     Troponin T <0.010 ng/mL     Narrative:      Troponin T Reference Range:  <= 0.03 ng/mL-   Negative for AMI  >0.03 ng/mL-     Abnormal for myocardial necrosis.  Clinicians would have to utilize clinical acumen, EKG, Troponin and serial changes to determine if it is an Acute Myocardial Infarction or myocardial injury due to an underlying chronic condition.       Results may be falsely decreased if patient taking Biotin.      CBC Auto Differential [[id number]]  (Abnormal) Collected: 07/16/22 1154    Specimen: Blood Updated: 07/16/22 1214     WBC 8.99 10*3/mm3      RBC 4.92 10*6/mm3      Hemoglobin 15.7 g/dL      Hematocrit 44.5 %      MCV 90.4 fL      MCH 31.9 pg      MCHC 35.3 g/dL      RDW 12.7 %      RDW-SD 41.1 fl      MPV 9.1 fL      Platelets 250 10*3/mm3      Neutrophil % 70.3 %      Lymphocyte % 17.4 %      Monocyte % 11.1 %      Eosinophil % 0.6 %      Basophil % 0.2 %      Immature Grans % 0.4 %      Neutrophils, Absolute 6.32 10*3/mm3      Lymphocytes, Absolute 1.56 10*3/mm3      Monocytes, Absolute 1.00 10*3/mm3      Eosinophils, Absolute 0.05 10*3/mm3      Basophils, Absolute 0.02 10*3/mm3      Immature Grans, Absolute 0.04 10*3/mm3      nRBC 0.0 /100 WBC     COVID PRE-OP / PRE-PROCEDURE SCREENING ORDER (NO ISOLATION) - Swab, Nasopharynx [[id number]]  (Normal) Collected: 07/16/22 1501    Specimen: Swab from Nasopharynx Updated: 07/16/22 9477    Narrative:      The following orders were created for panel order COVID PRE-OP / PRE-PROCEDURE SCREENING ORDER (NO ISOLATION) - Swab, Nasopharynx.  Procedure                               Abnormality         Status                     ---------                               -----------          ------                     COVID-19,[facility]...[[id number]]  Normal              Final result                 Please view results for these tests on the individual orders.    COVID-19,[facility] CEPHEID/VALENTE NP SWAB IN TRANSPORT MEDIA 8-12 HR TAT - Swab, Nasopharynx [[id number]]  (Normal) Collected: 07/16/22 1501    Specimen: Swab from Nasopharynx Updated: 07/16/22 1547     COVID19 Not Detected    Narrative:      Fact sheet for providers: https://www.fda.gov/media/234495/download     Fact sheet for patients: https://www.fda.gov/media/840986/download          Imaging Results (Last 24 Hours)     Procedure Component Value Units Date/Time    XR Hand 3+ View Right [[id number]] Collected: 07/16/22 1343     Updated: 07/16/22 1351    Narrative:      RIGHT HAND: PA, LATERAL, OBLIQUE     HISTORY: Right hand pain. Fall.     COMPARISON: None     FINDINGS: There is no evidence for fracture or acute abnormality of the  right hand. The soft tissues appear within normal limits.       Impression:      Negative right hand.     This report was finalized on 7/16/2022 1:48 PM by Dr. Demetrio Mcgowan M.D.       CT Angiogram Chest [[id number]] Collected: 07/16/22 1311     Updated: 07/16/22 1319    Narrative:      CT ANGIOGRAM OF THE CHEST. MULTIPLE CORONAL, SAGITTAL, AND 3-D  RECONSTRUCTIONS.     HISTORY: Chest pain. History of pulmonary embolism.     TECHNIQUE: Radiation dose reduction techniques were utilized, including  automated exposure control and exposure modulation based on body size.   CT angiogram of the chest was performed following the administration of  IV contrast. Coronal, sagittal, and 3-D reconstruction images were  obtained.     COMPARISON:  CT angiogram chest 05/13/2020.     FINDINGS: There are extensive bilateral pulmonary emboli. A large  embolus is present within the distal left pulmonary artery distal to the  origin of the left upper lobe segmental arteries. Left lower lobe emboli  extend into  anteromedial basal, posterior basal, lateral basal segments  of the left lower lobe. There is no embolus within the right main  pulmonary artery though there is a small embolus within the distal right  interlobar pulmonary artery and emboli on the right extend into  superior, medial, posterior basal segments of the right lower lobe.  There is airspace disease at the left posterior costophrenic angle that  is suspected to represent developing infarction. There is no evidence  for mediastinal or hilar eloy enlargement. The RV:LV ratio is within  normal limits. There is residual thymic tissue. No axillary eloy  enlargement is evident. Imaging through the upper abdomen demonstrates  diffuse fat infiltration of the liver.       Impression:      Extensive bilateral pulmonary emboli greatest at both lower  lobes with the largest emboli within the distal left main pulmonary  artery and extending into lower lobe segmental segments. Suspect  developing infarction within the left lower lobe along the left  posterior costophrenic angle.     Discussed with Dr. Estrada in the emergency department on 07/16/2022  at 1 PM.     This report was finalized on 7/16/2022 1:16 PM by Dr. Demetrio Mcgowan M.D.       XR Chest 2 View [[id number]] Collected: 07/16/22 1243     Updated: 07/16/22 1302    Narrative:      CHEST: 2 VIEWS     HISTORY: Shortness of air.     COMPARISON: CT angiogram chest 05/13/2020.     FINDINGS: Heart size appears within normal limits. There is increased  opacity overlying the posterior costophrenic angles on the lateral view  which appears to superimpose the left lung base on the medial view and  is consistent with a lower lobe infiltrate in the proper clinical  setting. There is no perihilar edema. Cardiac monitoring leads are  present.       Impression:      Increased opacity superimposes the posterior costophrenic  angles on the lateral view consistent with a mild infiltrate in the  proper clinical setting and  this appears to be within the left lower  lobe on the PA view.     This report was finalized on 7/16/2022 12:44 PM by Dr. Demetrio Mcgowan M.D.             ECG 12 Lead   Final Result   HEART RATE= 86  bpm   RR Interval= 698  ms   [place] Interval= 131  ms   P Horizontal Axis= -15  deg   P Front Axis= 75  deg   QRSD Interval= 86  ms   QT Interval= 372  ms   QRS Axis= 65  deg   T Wave Axis= 43  deg   - NORMAL ECG -   Sinus rhythm   No change from previous tracing   Electronically Signed By: Vinicio Hagen ([facility]) 16-Jul-2022 17:00:41   Date and Time of Study: 2022-07-16 12:06:11        Assessment/Plan   Assessment & Plan   Active Hospital Problems    Diagnosis  POA   • **Acute pulmonary embolism without acute cor pulmonale (HCC) [I26.99]  Yes   • Pulmonary infarction (HCC) [I26.99]  Yes   • Pleuritic chest pain [R07.81]  Yes   • 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       21 y.o. male admitted with Acute pulmonary embolism without acute cor pulmonale (HCC).    He received Lovenox in the emergency department which will be continued for now.  Will ask his hematologist to reevaluate him.  Previous hypercoagulable work-up negative except for persistently elevated factor VIII level.  He is hemodynamically stable with no evidence of RV strain but likely does have pulmonary infarction which is causing his severe pain.  He has had minimal relief with Norco 7.5 mg.  Will change to Percocet 7.5 mg 1 or 2 tablets every 4 hours as needed for pain.  Will allow 1 more dose of Dilaudid IV this evening if needed but explained to patient need to get pain controlled with oral medications because I suspect he could go home and 24 to 48 hours.  Will check lower extremity venous Doppler.      · Full code.  · Discussed with patient and ED provider.      Diaz Dixon MD  [facility]  07/16/22  20:14 EDT

## 2022-07-16 NOTE — PROGRESS NOTES
Clinical Pharmacy Services: Medication History    Jourdan Dumont is a 21 y.o. male presenting to [facility] for   Chief Complaint   Patient presents with   • Shortness of Breath       He  has a past medical history of History of blood clots and Pulmonary embolism (HCC).    Allergies as of 07/16/2022   • (No Known Allergies)       Medication information was obtained from: patient  Pharmacy and Phone Number:     Prior to Admission Medications     Prescriptions Last Dose Informant Patient Reported? Taking?    apixaban (ELIQUIS) 2.5 MG tablet tablet 7/16/2022 Self No Yes    Take 1 tablet by mouth Every 12 (Twelve) Hours for 360 days.            Medication notes:     This medication list is complete to the best of my knowledge as of 7/16/2022    Please call if questions.    Shannon Williamson  Medication History Technician  [phone]    7/16/2022 16:10 EDT

## 2022-07-16 NOTE — ED TRIAGE NOTES
Pt reports SOA  That started 3 days ago. Pt reports a sharp pain with inspiration. PT reports a hx of PE. Pt reports he is on Eliquis     Pt was wearing a mask during assessment.  This RN wore appropriate PPE

## 2022-07-17 ENCOUNTER — APPOINTMENT (OUTPATIENT)
Dept: CARDIOLOGY | Facility: HOSPITAL | Age: 22
End: 2022-07-17

## 2022-07-17 VITALS
WEIGHT: 145 LBS | RESPIRATION RATE: 18 BRPM | TEMPERATURE: 98.7 F | OXYGEN SATURATION: 99 % | SYSTOLIC BLOOD PRESSURE: 118 MMHG | HEART RATE: 74 BPM | BODY MASS INDEX: 21.98 KG/M2 | DIASTOLIC BLOOD PRESSURE: 82 MMHG | HEIGHT: 68 IN

## 2022-07-17 PROBLEM — I26.99 ACUTE PULMONARY EMBOLISM WITHOUT ACUTE COR PULMONALE, UNSPECIFIED PULMONARY EMBOLISM TYPE (HCC): Status: ACTIVE | Noted: 2022-07-17

## 2022-07-17 PROBLEM — D68.59 HYPERCOAGULABLE STATE (HCC): Status: ACTIVE | Noted: 2022-07-17

## 2022-07-17 LAB
ANION GAP SERPL CALCULATED.3IONS-SCNC: 14 MMOL/L (ref 5–15)
BH CV LOWER VASCULAR LEFT COMMON FEMORAL AUGMENT: NORMAL
BH CV LOWER VASCULAR LEFT COMMON FEMORAL COMPETENT: NORMAL
BH CV LOWER VASCULAR LEFT COMMON FEMORAL COMPRESS: NORMAL
BH CV LOWER VASCULAR LEFT COMMON FEMORAL PHASIC: NORMAL
BH CV LOWER VASCULAR LEFT COMMON FEMORAL SPONT: NORMAL
BH CV LOWER VASCULAR LEFT DISTAL FEMORAL COMPRESS: NORMAL
BH CV LOWER VASCULAR LEFT GASTRONEMIUS COMPRESS: NORMAL
BH CV LOWER VASCULAR LEFT GREATER SAPH AK COMPRESS: NORMAL
BH CV LOWER VASCULAR LEFT GREATER SAPH BK COMPRESS: NORMAL
BH CV LOWER VASCULAR LEFT LESSER SAPH COMPRESS: NORMAL
BH CV LOWER VASCULAR LEFT MID FEMORAL AUGMENT: NORMAL
BH CV LOWER VASCULAR LEFT MID FEMORAL COMPETENT: NORMAL
BH CV LOWER VASCULAR LEFT MID FEMORAL COMPRESS: NORMAL
BH CV LOWER VASCULAR LEFT MID FEMORAL PHASIC: NORMAL
BH CV LOWER VASCULAR LEFT MID FEMORAL SPONT: NORMAL
BH CV LOWER VASCULAR LEFT PERONEAL COMPRESS: NORMAL
BH CV LOWER VASCULAR LEFT POPLITEAL AUGMENT: NORMAL
BH CV LOWER VASCULAR LEFT POPLITEAL COMPETENT: NORMAL
BH CV LOWER VASCULAR LEFT POPLITEAL COMPRESS: NORMAL
BH CV LOWER VASCULAR LEFT POPLITEAL PHASIC: NORMAL
BH CV LOWER VASCULAR LEFT POPLITEAL SPONT: NORMAL
BH CV LOWER VASCULAR LEFT POSTERIOR TIBIAL COMPRESS: NORMAL
BH CV LOWER VASCULAR LEFT PROFUNDA FEMORAL COMPRESS: NORMAL
BH CV LOWER VASCULAR LEFT PROXIMAL FEMORAL COMPRESS: NORMAL
BH CV LOWER VASCULAR LEFT SAPHENOFEMORAL JUNCTION COMPRESS: NORMAL
BH CV LOWER VASCULAR RIGHT COMMON FEMORAL AUGMENT: NORMAL
BH CV LOWER VASCULAR RIGHT COMMON FEMORAL COMPETENT: NORMAL
BH CV LOWER VASCULAR RIGHT COMMON FEMORAL COMPRESS: NORMAL
BH CV LOWER VASCULAR RIGHT COMMON FEMORAL PHASIC: NORMAL
BH CV LOWER VASCULAR RIGHT COMMON FEMORAL SPONT: NORMAL
BH CV LOWER VASCULAR RIGHT DISTAL FEMORAL COMPRESS: NORMAL
BH CV LOWER VASCULAR RIGHT GASTRONEMIUS COMPRESS: NORMAL
BH CV LOWER VASCULAR RIGHT GREATER SAPH AK COMPRESS: NORMAL
BH CV LOWER VASCULAR RIGHT GREATER SAPH BK COMPRESS: NORMAL
BH CV LOWER VASCULAR RIGHT LESSER SAPH COMPRESS: NORMAL
BH CV LOWER VASCULAR RIGHT MID FEMORAL AUGMENT: NORMAL
BH CV LOWER VASCULAR RIGHT MID FEMORAL COMPETENT: NORMAL
BH CV LOWER VASCULAR RIGHT MID FEMORAL COMPRESS: NORMAL
BH CV LOWER VASCULAR RIGHT MID FEMORAL PHASIC: NORMAL
BH CV LOWER VASCULAR RIGHT MID FEMORAL SPONT: NORMAL
BH CV LOWER VASCULAR RIGHT PERONEAL COMPRESS: NORMAL
BH CV LOWER VASCULAR RIGHT POPLITEAL AUGMENT: NORMAL
BH CV LOWER VASCULAR RIGHT POPLITEAL COMPETENT: NORMAL
BH CV LOWER VASCULAR RIGHT POPLITEAL COMPRESS: NORMAL
BH CV LOWER VASCULAR RIGHT POPLITEAL PHASIC: NORMAL
BH CV LOWER VASCULAR RIGHT POPLITEAL SPONT: NORMAL
BH CV LOWER VASCULAR RIGHT POSTERIOR TIBIAL COMPRESS: NORMAL
BH CV LOWER VASCULAR RIGHT PROFUNDA FEMORAL COMPRESS: NORMAL
BH CV LOWER VASCULAR RIGHT PROXIMAL FEMORAL COMPRESS: NORMAL
BH CV LOWER VASCULAR RIGHT SAPHENOFEMORAL JUNCTION COMPRESS: NORMAL
BUN SERPL-MCNC: 7 MG/DL (ref 6–20)
BUN/CREAT SERPL: 9.9 (ref 7–25)
CALCIUM SPEC-SCNC: 9.1 MG/DL (ref 8.6–10.5)
CHLORIDE SERPL-SCNC: 95 MMOL/L (ref 98–107)
CO2 SERPL-SCNC: 25 MMOL/L (ref 22–29)
CREAT SERPL-MCNC: 0.71 MG/DL (ref 0.76–1.27)
DEPRECATED RDW RBC AUTO: 42.7 FL (ref 37–54)
EGFRCR SERPLBLD CKD-EPI 2021: 132.2 ML/MIN/1.73
ERYTHROCYTE [DISTWIDTH] IN BLOOD BY AUTOMATED COUNT: 12.6 % (ref 12.3–15.4)
GLUCOSE SERPL-MCNC: 86 MG/DL (ref 65–99)
HCT VFR BLD AUTO: 45.1 % (ref 37.5–51)
HGB BLD-MCNC: 15 G/DL (ref 13–17.7)
MAXIMAL PREDICTED HEART RATE: 197 BPM
MCH RBC QN AUTO: 30.9 PG (ref 26.6–33)
MCHC RBC AUTO-ENTMCNC: 33.3 G/DL (ref 31.5–35.7)
MCV RBC AUTO: 92.8 FL (ref 79–97)
PLATELET # BLD AUTO: 281 10*3/MM3 (ref 140–450)
PMV BLD AUTO: 9.1 FL (ref 6–12)
POTASSIUM SERPL-SCNC: 3.7 MMOL/L (ref 3.5–5.2)
RBC # BLD AUTO: 4.86 10*6/MM3 (ref 4.14–5.8)
SODIUM SERPL-SCNC: 134 MMOL/L (ref 136–145)
STRESS TARGET HR: 167 BPM
WBC NRBC COR # BLD: 8.86 10*3/MM3 (ref 3.4–10.8)

## 2022-07-17 PROCEDURE — 25010000002 ENOXAPARIN PER 10 MG: Performed by: HOSPITALIST

## 2022-07-17 PROCEDURE — 93970 EXTREMITY STUDY: CPT

## 2022-07-17 PROCEDURE — 99214 OFFICE O/P EST MOD 30 MIN: CPT | Performed by: INTERNAL MEDICINE

## 2022-07-17 PROCEDURE — 96372 THER/PROPH/DIAG INJ SC/IM: CPT

## 2022-07-17 PROCEDURE — G0378 HOSPITAL OBSERVATION PER HR: HCPCS

## 2022-07-17 PROCEDURE — 25010000002 ONDANSETRON PER 1 MG: Performed by: HOSPITALIST

## 2022-07-17 PROCEDURE — 85027 COMPLETE CBC AUTOMATED: CPT | Performed by: HOSPITALIST

## 2022-07-17 PROCEDURE — 80048 BASIC METABOLIC PNL TOTAL CA: CPT | Performed by: HOSPITALIST

## 2022-07-17 PROCEDURE — 96376 TX/PRO/DX INJ SAME DRUG ADON: CPT

## 2022-07-17 RX ORDER — ACETAMINOPHEN 325 MG/1
650 TABLET ORAL EVERY 6 HOURS PRN
Start: 2022-07-17

## 2022-07-17 RX ORDER — ONDANSETRON 4 MG/1
4 TABLET, FILM COATED ORAL EVERY 8 HOURS PRN
Qty: 30 TABLET | Refills: 0 | Status: SHIPPED | OUTPATIENT
Start: 2022-07-17

## 2022-07-17 RX ORDER — APIXABAN 5 MG (74)
5 KIT ORAL TAKE AS DIRECTED
Qty: 74 TABLET | Refills: 0 | Status: SHIPPED | OUTPATIENT
Start: 2022-07-17

## 2022-07-17 RX ORDER — OXYCODONE AND ACETAMINOPHEN 7.5; 325 MG/1; MG/1
TABLET ORAL
Qty: 36 TABLET | Refills: 0 | Status: SHIPPED | OUTPATIENT
Start: 2022-07-17

## 2022-07-17 RX ADMIN — OXYCODONE HYDROCHLORIDE AND ACETAMINOPHEN 2 TABLET: 7.5; 325 TABLET ORAL at 02:50

## 2022-07-17 RX ADMIN — ONDANSETRON 4 MG: 2 INJECTION INTRAMUSCULAR; INTRAVENOUS at 08:30

## 2022-07-17 RX ADMIN — OXYCODONE HYDROCHLORIDE AND ACETAMINOPHEN 2 TABLET: 7.5; 325 TABLET ORAL at 12:53

## 2022-07-17 RX ADMIN — ENOXAPARIN SODIUM 70 MG: 80 INJECTION SUBCUTANEOUS at 08:19

## 2022-07-17 RX ADMIN — OXYCODONE HYDROCHLORIDE AND ACETAMINOPHEN 2 TABLET: 7.5; 325 TABLET ORAL at 08:19

## 2022-07-17 RX ADMIN — ONDANSETRON 4 MG: 2 INJECTION INTRAMUSCULAR; INTRAVENOUS at 03:52

## 2022-07-17 NOTE — NURSING NOTE
Discharge instructions provided to and discussed with patient. He will start his eliquis this evening and follow up with Dr. Serrato. Scripts filled by OP pharmacy. Patient verbalizes understanding and has no questions at this time. Discharged by private vehicle.

## 2022-07-17 NOTE — CONSULTS
Subjective     REASON FOR CONSULTATION: Recurrent venous thromboembolism  Provide an opinion on any further workup or treatment                             REQUESTING PHYSICIAN: Dr. Dixon    RECORDS OBTAINED:  Records of the patients history including those obtained from the referring provider were reviewed and summarized in detail.      History of Present Illness   This is a pleasant 21-year-old man followed by Dr. Serrato in our clinic most recently seen 1/20/2022.  The patient has a previous history of unprovoked left lower extremity DVT in the posterior tibial vein  and bilateral lower lobe pulmonary emboli 5/13/2020.  He underwent a hypercoagulable evaluation and evaluation for malignancy which was negative except for an elevated factor VIII level of unclear significance.  The patient was treated with Eliquis therapy and eventually dose reduced to prophylactic dose Eliquis 2.5 mg every 12 hours in January 2021.  Per Dr. Serrato's note 1/20/2022 continued prophylactic dose Eliquis was recommended to the patient given his unprovoked pulmonary emboli.  The patient however states that he discontinued his anticoagulation approximately 6 months ago.    The patient is admitted after developing chest pain pleuritic in nature, worse with lying flat with associated progressive shortness of breath reminiscent of his previous pulmonary embolism.  He took some Eliquis that he had at home but presented to the ER and CT angiogram chest 7/16/2022 showed extensive bilateral pulmonary emboli greatest in the lower lobes largest in the distal left main pulmonary artery into the lower lobe segments with development of infarction in the left lower lobe along the left costophrenic angle.    The patient is currently on Lovenox 1 mg/kg every 12 hours.    He continues to have pleuritic chest pain controlled with oral medications.  His shortness of breath and chest pain have improved since anticoagulation.  He is not requiring supplemental  "oxygen.    The patient reports no other family members with blood clots.    Past Medical History:   Diagnosis Date   • History of blood clots    • Pulmonary embolism (HCC)         No past surgical history on file.     No current facility-administered medications on file prior to encounter.     Current Outpatient Medications on File Prior to Encounter   Medication Sig Dispense Refill   • apixaban (ELIQUIS) 2.5 MG tablet tablet Take 1 tablet by mouth Every 12 (Twelve) Hours for 360 days. 180 tablet 3        ALLERGIES:  No Known Allergies     Social History     Socioeconomic History   • Marital status: Single   Tobacco Use   • Smoking status: Current Every Day Smoker   • Smokeless tobacco: Never Used   Substance and Sexual Activity   • Alcohol use: Yes   • Drug use: Yes     Types: Marijuana        No family history on file.     Review of Systems   Constitutional: Negative.    HENT: Negative.    Respiratory: Positive for cough and shortness of breath.    Cardiovascular: Positive for chest pain.   Gastrointestinal: Negative.    Endocrine: Negative.    Genitourinary: Negative.    Musculoskeletal: Negative.    Skin: Negative.    Allergic/Immunologic: Negative.    Neurological: Negative.    Hematological: Negative.    Psychiatric/Behavioral: Negative.         Objective     Vitals:    07/16/22 1900 07/16/22 2017 07/17/22 0006 07/17/22 0723   BP: 122/78  123/76 122/83   BP Location: Left arm  Left arm Left arm   Patient Position: Lying  Lying Lying   Pulse: 91  88 79   Resp: 18  18 18   Temp: 98.6 °F (37 °C)  98 °F (36.7 °C) 98.3 °F (36.8 °C)   TempSrc: Oral  Oral Oral   SpO2: 100%  97% 95%   Weight:       Height:  172 cm (67.72\")       Current Status 1/20/2022   ECOG score 0       Physical Exam    CONSTITUTIONAL: pleasant well-developed thin young man  HEENT: no icterus, no thrush, moist membranes  CV: RRR, S1S2, no murmur  RESP: cta bilat, no wheezing, no rales, the patient has normal work of breathing with walking around " the room and with conversation on room air  GI: soft, non-tender, no splenomegaly, +bs  MUSC: no edema, normal gait  NEURO: alert and oriented x3, normal strength  PSYCH: normal mood and affect    RECENT LABS:  Hematology WBC   Date Value Ref Range Status   07/17/2022 8.86 3.40 - 10.80 10*3/mm3 Final     RBC   Date Value Ref Range Status   07/17/2022 4.86 4.14 - 5.80 10*6/mm3 Final     Hemoglobin   Date Value Ref Range Status   07/17/2022 15.0 13.0 - 17.7 g/dL Final     Hematocrit   Date Value Ref Range Status   07/17/2022 45.1 37.5 - 51.0 % Final     Platelets   Date Value Ref Range Status   07/17/2022 281 140 - 450 10*3/mm3 Final        Lab Results   Component Value Date    GLUCOSE 86 07/17/2022    BUN 7 07/17/2022    CREATININE 0.71 (L) 07/17/2022    EGFRIFNONA 86 07/22/2021    EGFRIFAFRI 142 05/15/2020    BCR 9.9 07/17/2022    K 3.7 07/17/2022    CO2 25.0 07/17/2022    CALCIUM 9.1 07/17/2022    ALBUMIN 4.20 07/16/2022    LABIL2 1.1 05/19/2020    AST 25 07/16/2022    ALT 26 07/16/2022     CT angiogram chest 7/16/2022:  IMPRESSION:  Extensive bilateral pulmonary emboli greatest at both lower  lobes with the largest emboli within the distal left main pulmonary  artery and extending into lower lobe segmental segments. Suspect  developing infarction within the left lower lobe along the left  posterior costophrenic angle.  The RV: LV ratio was within normal limits      Assessment & Plan     *Recurrent VTE  · The patient had a previous posterior tibial vein thrombosis and bilateral pulmonary emboli unprovoked May 2020 treated with Eliquis  · Previous hypercoagulable and malignancy evaluation negative except for elevated factor VIII level of unclear significance  · Patient most recently seen in the office 1/20/2022 with recommendations to continue prophylactic dose Eliquis 2.5 mg every 12 hours but the patient admits stopping anticoagulation about 6 months prior to this admission  · Admitted with shortness of  breath/chest pain- CT angiogram with new extensive bilateral pulmonary emboli both lower lobes with left lower lobe pulmonary infarction, normal LV: RV ratio  · Currently on Lovenox 1 mg/kg every 12 hours  · O2 sats are 95 to 100% on room air, patient is hemodynamically stable    Hematology plan/recommendations:  The patient has recurrent unprovoked venous thromboembolism and will need lifelong anticoagulation.  Previous extended prophylactic anticoagulation with Eliquis recommended to the patient as of his office visit 1/20/2022 but he self discontinued anticoagulation apparently soon after that visit.    I agree with checking bilateral lower extremity Doppler ultrasounds as a new baseline.      The patient is not requiring supplemental oxygen and is hemodynamically stable.  I think he can be switched back to an NOAC therapy so I will go ahead and change him back to Eliquis 10 mg p.o. every 12 hours x7 days (next dose 2100) followed by 5 mg every 12 hours.  I will have him reevaluated by Dr. Serrato in 2 to 3 weeks in clinic.    Pain control per admitting.    Thank you for allowing me to participate in the care of this pleasant patient.  Please call if further needed during the hospital stay.

## 2022-07-17 NOTE — DISCHARGE SUMMARY
[facility] Medicine Services  DISCHARGE SUMMARY    Patient Name: Jourdan Dumont  : 2000  MRN: [id number]    Date of Admission: 2022 11:33 AM  Date of Discharge: 2022  Primary Care Physician: Sav Jennings NP    Consults     Date and Time Order Name Status Description    2022  6:05 PM Inpatient Hematology & Oncology Consult            Hospital Course       Active Hospital Problems    Diagnosis  POA   • **Acute pulmonary embolism without acute cor pulmonale (HCC) [I26.99]  Yes   • Hypercoagulable state (HCC) [D68.59]  Yes   • Pulmonary infarction (HCC) [I26.99]  Yes   • Pleuritic chest pain [R07.81]  Yes   • 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          Hospital Course:  Jourdan Dumont is a 21 y.o. male with past medical history of pulmonary as well as and who was previously treated with anticoagulation.  Reportedly he had stopped anticoagulation without confirming this with his outpatient oncologist.  Patient developed acute severe pleuritic passive chest pain and presented back to the emergency department and on CT scan he was found to have acute pulmonary embolus with pulmonary infarction.  Patient was treated by Lovenox and his pleuritic pain improved however he has continued to require Percocet for adequate pain control.  He was evaluated by the hematology oncology consult team who feels he has hypercoagulable state and will need to be on lifelong anticoagulation.  This was discussed with the patient who is in agreement.  He is transitioning to Eliquis per the recommendation.  He currently is hemodynamically stable and is having no signs of tachycardia, hypoxia, or congestive heart failure related to his pulmonary embolus.  Patient is not having any active hemoptysis and is requesting discharge home.  I have spoken with hematology who feel that he is stable to discharge home and have sent a prescription for pain  medication and anticoagulation after providing extensive counseling.  I discussed the risks, benefits, and alternatives to narcotic therapies.  After counseling, due to pain severity, the patient feels narcotics are needed to adequately controlled their pain.  They agreed to only take the medication as prescribed, to hold with any sign of sedation, and to avoid driving while actively using narcotics.    At the time of discharge patient was told to take all medications as prescribed, keep all follow-up appointments, and call their doctor or return to the hospital with any worsening or concerning symptoms.    Please note that this note was made using Dragon voice recognition software          Day of Discharge     HPI:   Patient feels well.  Breathing well.  Wants to go home.  Notes that the Percocet is adequately controlling his pleuritic chest pain.  Agrees to follow-up with primary care provider and hematologist and continue anticoagulation indefinitely.    ROS:  No current fevers or chills  No current shortness of breath or cough  No current nausea, vomiting, or diarrhea    Vital Signs:   Temp:  [98 °F (36.7 °C)-98.7 °F (37.1 °C)] 98.7 °F (37.1 °C)  Heart Rate:  [74-91] 74  Resp:  [18] 18  BP: (118-127)/(76-86) 118/82     Physical Exam:  Constitutional:Awake, alert  HENT: NCAT, mucous membranes moist, neck supple  Respiratory: Some pleuritic chest pain with deep inspiration, no cough, clear to auscultation bilaterally, respiratory effort normal, nonlabored breathing currently on room air oxygen 99% pulse oximetry  Cardiovascular: RRR, normal radial pulses  Gastrointestinal: Positive bowel sounds, soft, nontender, nondistended  Musculoskeletal: Normal musculature for age, no lower extremity edema, BMI 22  Psychiatric: Appropriate affect, cooperative, conversational  Neurologic: No slurred speech or facial droop, follows commands  Skin: No rashes or jaundice, warm      Pertinent  and/or Most Recent Results      Results from last 7 days   Lab Units 07/17/22  0627 07/16/22  1154   WBC 10*3/mm3 8.86 8.99   HEMOGLOBIN g/dL 15.0 15.7   HEMATOCRIT % 45.1 44.5   PLATELETS 10*3/mm3 281 250   SODIUM mmol/L 134* 135*   POTASSIUM mmol/L 3.7 4.3   CHLORIDE mmol/L 95* 94*   CO2 mmol/L 25.0 27.4   BUN mg/dL 7 6   CREATININE mg/dL 0.71* 0.70*   GLUCOSE mg/dL 86 81   CALCIUM mg/dL 9.1 9.7     Results from last 7 days   Lab Units 07/16/22  1154   BILIRUBIN mg/dL 0.9   ALK PHOS U/L 78   ALT (SGPT) U/L 26   AST (SGOT) U/L 25           Invalid input(s): TG, LDLCALC, LDLREALC  Results from last 7 days   Lab Units 07/16/22  1154   PROBNP pg/mL 7.4   TROPONIN T ng/mL <0.010       Brief Urine Lab Results     None          Microbiology Results Abnormal     Procedure Component Value - Date/Time    COVID PRE-OP / PRE-PROCEDURE SCREENING ORDER (NO ISOLATION) - Swab, Nasopharynx [[id number]]  (Normal) Collected: 07/16/22 1501    Lab Status: Final result Specimen: Swab from Nasopharynx Updated: 07/16/22 1547    Narrative:      The following orders were created for panel order COVID PRE-OP / PRE-PROCEDURE SCREENING ORDER (NO ISOLATION) - Swab, Nasopharynx.  Procedure                               Abnormality         Status                     ---------                               -----------         ------                     COVID-19,[facility]...[[id number]]  Normal              Final result                 Please view results for these tests on the individual orders.    COVID-19,[facility] CEPHEID/VALENTE NP SWAB IN TRANSPORT MEDIA 8-12 HR TAT - Swab, Nasopharynx [[id number]]  (Normal) Collected: 07/16/22 1501    Lab Status: Final result Specimen: Swab from Nasopharynx Updated: 07/16/22 1547     COVID19 Not Detected    Narrative:      Fact sheet for providers: https://www.fda.gov/media/313009/download     Fact sheet for patients: https://www.fda.gov/media/784192/download          Imaging Results (All)     Procedure Component Value Units  Date/Time    XR Hand 3+ View Right [[id number]] Collected: 07/16/22 1343     Updated: 07/16/22 1351    Narrative:      RIGHT HAND: PA, LATERAL, OBLIQUE     HISTORY: Right hand pain. Fall.     COMPARISON: None     FINDINGS: There is no evidence for fracture or acute abnormality of the  right hand. The soft tissues appear within normal limits.       Impression:      Negative right hand.     This report was finalized on 7/16/2022 1:48 PM by Dr. Demetrio Mcgowan M.D.       CT Angiogram Chest [[id number]] Collected: 07/16/22 1311     Updated: 07/16/22 1319    Narrative:      CT ANGIOGRAM OF THE CHEST. MULTIPLE CORONAL, SAGITTAL, AND 3-D  RECONSTRUCTIONS.     HISTORY: Chest pain. History of pulmonary embolism.     TECHNIQUE: Radiation dose reduction techniques were utilized, including  automated exposure control and exposure modulation based on body size.   CT angiogram of the chest was performed following the administration of  IV contrast. Coronal, sagittal, and 3-D reconstruction images were  obtained.     COMPARISON:  CT angiogram chest 05/13/2020.     FINDINGS: There are extensive bilateral pulmonary emboli. A large  embolus is present within the distal left pulmonary artery distal to the  origin of the left upper lobe segmental arteries. Left lower lobe emboli  extend into anteromedial basal, posterior basal, lateral basal segments  of the left lower lobe. There is no embolus within the right main  pulmonary artery though there is a small embolus within the distal right  interlobar pulmonary artery and emboli on the right extend into  superior, medial, posterior basal segments of the right lower lobe.  There is airspace disease at the left posterior costophrenic angle that  is suspected to represent developing infarction. There is no evidence  for mediastinal or hilar eloy enlargement. The RV:LV ratio is within  normal limits. There is residual thymic tissue. No axillary eloy  enlargement is evident. Imaging  through the upper abdomen demonstrates  diffuse fat infiltration of the liver.       Impression:      Extensive bilateral pulmonary emboli greatest at both lower  lobes with the largest emboli within the distal left main pulmonary  artery and extending into lower lobe segmental segments. Suspect  developing infarction within the left lower lobe along the left  posterior costophrenic angle.     Discussed with Dr. Estrada in the emergency department on 07/16/2022  at 1 PM.     This report was finalized on 7/16/2022 1:16 PM by Dr. Demetrio Mcgowan M.D.       XR Chest 2 View [[id number]] Collected: 07/16/22 1243     Updated: 07/16/22 1302    Narrative:      CHEST: 2 VIEWS     HISTORY: Shortness of air.     COMPARISON: CT angiogram chest 05/13/2020.     FINDINGS: Heart size appears within normal limits. There is increased  opacity overlying the posterior costophrenic angles on the lateral view  which appears to superimpose the left lung base on the medial view and  is consistent with a lower lobe infiltrate in the proper clinical  setting. There is no perihilar edema. Cardiac monitoring leads are  present.       Impression:      Increased opacity superimposes the posterior costophrenic  angles on the lateral view consistent with a mild infiltrate in the  proper clinical setting and this appears to be within the left lower  lobe on the PA view.     This report was finalized on 7/16/2022 12:44 PM by Dr. Demetrio Mcgowan M.D.             Results for orders placed during the hospital encounter of 01/04/21    Duplex Venous Lower Extremity - Left CAR    Interpretation Summary  · Normal left lower extremity venous duplex scan.      Results for orders placed during the hospital encounter of 01/04/21    Duplex Venous Lower Extremity - Left CAR    Interpretation Summary  · Normal left lower extremity venous duplex scan.          Discharge Details        Discharge Medications      New Medications      Instructions Start Date    acetaminophen 325 MG tablet  Commonly known as: TYLENOL   650 mg, Oral, Every 6 Hours PRN      Eliquis DVT/PE Starter Pack tablet therapy pack  Generic drug: Apixaban Starter Pack  Replaces: apixaban 2.5 MG tablet tablet   5 mg, Oral, Take As Directed      oxyCODONE-acetaminophen 7.5-325 MG per tablet  Commonly known as: PERCOCET   Take 1 to 2 tablets every 4 hours as needed for moderate to severe pain.         Stop These Medications    apixaban 2.5 MG tablet tablet  Commonly known as: ELIQUIS  Replaced by: Eliquis DVT/PE Starter Pack tablet therapy pack            No Known Allergies      Discharge Disposition:      Diet:  Hospital:  Diet Order   Procedures   • Diet Regular       Activity:  Activity Instructions     Up WIth Assist                 CODE STATUS:    Code Status and Medical Interventions:   Ordered at: 07/16/22 1805     Level Of Support Discussed With:    Patient     Code Status (Patient has no pulse and is not breathing):    CPR (Attempt to Resuscitate)     Medical Interventions (Patient has pulse or is breathing):    Full              Additional Instructions for the Follow-ups that You Need to Schedule     Discharge Follow-up with PCP   As directed       Currently Documented PCP:    Sav Jennings NP    PCP Phone Number:    [phone]     Follow Up Details: Recommend primary care follow-up within 1 week for general hospital follow-up.  Please call Monday to schedule.         Discharge Follow-up with Specified Provider: Hematology oncology clinic with Dr. Serrato in 2 to 3 weeks in clinic.  They are arranging appointment.  If you do not receive a call with your appointment by Wednesday please call the clinic to confirm.   As directed      To: Hematology oncology clinic with Dr. Serrato in 2 to 3 weeks in clinic.  They are arranging appointment.  If you do not receive a call with your appointment by Wednesday please call the clinic to confirm.            Follow-up Information     Sav Jennings,  NP .    Specialty: Nurse Practitioner  Why: Recommend primary care follow-up within 1 week for general hospital follow-up.  Please call Monday to schedule.  Contact information:  [facility] [address]  [phone]                             Deric Morrell MD  07/17/22      Time Spent on Discharge:  I spent 35 minutes on this discharge activity which included: face-to-face encounter with the patient, reviewing the data in the system, coordination of the care with the nursing staff as well as consultants, documentation, and entering orders.

## 2022-07-17 NOTE — PLAN OF CARE
Goal Outcome Evaluation:  Plan of Care Reviewed With: patient        Progress: no change  Outcome Evaluation: Patients vitals stable. Patient reports pain and nausea and PRN medications given. Patient had IV site changed this shift. Patient up independently up in room. Patient denies numbness and tingling this shift. Will continue to monitor.

## 2022-07-18 DIAGNOSIS — Z91.14 NONADHERENCE TO MEDICATION: ICD-10-CM

## 2022-07-18 DIAGNOSIS — I26.99 RECURRENT PULMONARY EMBOLISM: Primary | ICD-10-CM

## 2022-07-18 NOTE — PROGRESS NOTES
"Orders entered based on Dr. Naranjo's staff message:  \"Please arrange follow-up with Dr. Serrato 2 units 2 to 3 weeks CBC BMP for recurrent PE while nonadherent to prescribed prophylactic anticoagulation.\"    "

## 2022-07-18 NOTE — CASE MANAGEMENT/SOCIAL WORK
Case Management Discharge Note      Final Note: Home no additional dc orders noted for CCP. Scott RDZ/CCP         Selected Continued Care - Discharged on 7/17/2022 Admission date: 7/16/2022 - Discharge disposition: Home or Self Care    Destination    No services have been selected for the patient.              Durable Medical Equipment    No services have been selected for the patient.              Dialysis/Infusion    No services have been selected for the patient.              Home Medical Care    No services have been selected for the patient.              Therapy    No services have been selected for the patient.              Community Resources    No services have been selected for the patient.              Community & DME    No services have been selected for the patient.                  Transportation Services  Private: Car    Final Discharge Disposition Code: 01 - home or self-care  
Yes